# Patient Record
Sex: MALE | Race: BLACK OR AFRICAN AMERICAN | Employment: OTHER | URBAN - METROPOLITAN AREA
[De-identification: names, ages, dates, MRNs, and addresses within clinical notes are randomized per-mention and may not be internally consistent; named-entity substitution may affect disease eponyms.]

---

## 2019-06-25 ENCOUNTER — OFFICE VISIT (OUTPATIENT)
Dept: SURGERY | Age: 69
End: 2019-06-25

## 2019-06-25 VITALS
DIASTOLIC BLOOD PRESSURE: 66 MMHG | OXYGEN SATURATION: 96 % | RESPIRATION RATE: 20 BRPM | HEART RATE: 58 BPM | TEMPERATURE: 98.1 F | SYSTOLIC BLOOD PRESSURE: 118 MMHG | BODY MASS INDEX: 38.37 KG/M2 | WEIGHT: 299 LBS | HEIGHT: 74 IN

## 2019-06-25 DIAGNOSIS — K43.2 RECURRENT VENTRAL INCISIONAL HERNIA: Primary | ICD-10-CM

## 2019-06-25 RX ORDER — FENOFIBRATE 160 MG/1
160 TABLET ORAL DAILY
COMMUNITY
Start: 2019-04-29 | End: 2020-06-16

## 2019-06-25 RX ORDER — AMLODIPINE BESYLATE 10 MG/1
TABLET ORAL
Refills: 2 | Status: ON HOLD | COMMUNITY
Start: 2019-05-21 | End: 2021-06-02 | Stop reason: CLARIF

## 2019-06-25 NOTE — PROGRESS NOTES
HISTORY OF PRESENT ILLNESS  Temo Verma is a 77 y.o. male who comes in for consultation for a recurrent hernia  HPI    He underwent a left nephrectomy for renal cell ca in 2010. He developed a hernia at the hand port in the LLQ.   7/18/2016 Kumar Powell and August Pisano did a left sided component separation and incisional hernia repair with mesh. The next week he developed worsening pain and SOB and was worked up and found to have a recurrence of the hernia. Prior to surgery he reports intermittent pain and nausea/vomiting. He now has continuous pain which waxes and wanes. He reports heartburn but no N/V at this time. He denies fever, chills, or sweats. He is frustrated and has some SOB but CT of the chest was negative for PE by report. Dr Radha Powell is now recommending weight loss surgery. Past Medical History:   Diagnosis Date    CAD (coronary artery disease)     Hypercholesterolemia     Hypertension      Past Surgical History:   Procedure Laterality Date    CARDIAC SURG PROCEDURE UNLIST      3 bypas card    HX HERNIA REPAIR      HX UROLOGICAL      1 kidney removed. Family History   Problem Relation Age of Onset    Cancer Mother         Stomach Cancer    Heart Disease Father     Stroke Father     No Known Problems Sister     No Known Problems Brother     No Known Problems Brother     No Known Problems Brother     No Known Problems Sister     Cancer Sister     Cancer Brother     No Known Problems Brother      Social History     Tobacco Use    Smoking status: Current Every Day Smoker     Packs/day: 0.25     Years: 30.00     Pack years: 7.50    Smokeless tobacco: Never Used    Tobacco comment: 7-8 cigarettes a day. Substance Use Topics    Alcohol use: No    Drug use: Not Currently     Current Outpatient Medications   Medication Sig    fenofibrate (LOFIBRA) 160 mg tablet Take 160 mg by mouth daily.     amLODIPine (NORVASC) 10 mg tablet TAKE 1 TABLET BY MOUTH EVERY DAY    losartan (COZAAR) 100 mg tablet Take 100 mg by mouth daily.  hydrochlorothiazide (HYDRODIURIL) 25 mg tablet Take 25 mg by mouth daily.  CLOPIDOGREL BISULFATE (CLOPIDOGREL PO) Take  by mouth.  spironolactone (ALDACTONE) 25 mg tablet Take  by mouth daily. No current facility-administered medications for this visit. Allergies   Allergen Reactions    Percocet [Oxycodone-Acetaminophen] Other (comments)       Review of Systems   Constitutional: Negative for chills, diaphoresis, fever, malaise/fatigue and weight loss. HENT: Negative for congestion, ear pain and sore throat. Eyes: Negative for blurred vision and pain. Respiratory: Positive for shortness of breath. Negative for cough, hemoptysis, sputum production, wheezing and stridor. Cardiovascular: Positive for chest pain. Negative for palpitations, orthopnea, claudication, leg swelling and PND. Gastrointestinal: Positive for abdominal pain, constipation, diarrhea and heartburn. Negative for blood in stool, melena, nausea and vomiting. Genitourinary: Negative for dysuria, flank pain, frequency, hematuria and urgency. Musculoskeletal: Positive for back pain. Negative for joint pain, myalgias and neck pain. Skin: Negative for itching and rash. Neurological: Negative for dizziness, tremors, focal weakness, seizures, weakness and headaches. Endo/Heme/Allergies: Negative for polydipsia. Psychiatric/Behavioral: Negative for depression and memory loss. The patient is not nervous/anxious. Visit Vitals  /66 (BP 1 Location: Right arm, BP Patient Position: Sitting)   Pulse (!) 58   Temp 98.1 °F (36.7 °C) (Oral)   Resp 20   Ht 6' 2\" (1.88 m)   Wt 135.6 kg (299 lb)   SpO2 96%   BMI 38.39 kg/m²       Physical Exam   Constitutional: He is oriented to person, place, and time. He appears well-developed and well-nourished. No distress. HENT:   Head: Normocephalic and atraumatic.    Mouth/Throat: Oropharynx is clear and moist. No oropharyngeal exudate. Eyes: Pupils are equal, round, and reactive to light. Conjunctivae and EOM are normal. No scleral icterus. Neck: Normal range of motion. Neck supple. No tracheal deviation present. No thyromegaly present. Cardiovascular: Normal rate, regular rhythm and normal heart sounds. Exam reveals no gallop and no friction rub. No murmur heard. Pulmonary/Chest: Effort normal and breath sounds normal. No stridor. No respiratory distress. He has no wheezes. He has no rales. Abdominal: Soft. Normal appearance and bowel sounds are normal. He exhibits no distension, no pulsatile liver and no mass. There is no hepatosplenomegaly. There is no tenderness. There is no rebound, no guarding and no CVA tenderness. A hernia is present. Hernia confirmed positive in the ventral area (large reducible LLQ hernia). Hernia confirmed negative in the right inguinal area and confirmed negative in the left inguinal area. Genitourinary: Testes normal. Cremasteric reflex is present. Circumcised. Musculoskeletal: Normal range of motion. He exhibits no edema or tenderness. Lymphadenopathy:     He has no cervical adenopathy. Neurological: He is alert and oriented to person, place, and time. No cranial nerve deficit. Coordination normal.   Skin: Skin is warm and dry. No rash noted. He is not diaphoretic. No erythema. Psychiatric: He has a normal mood and affect. His behavior is normal. Judgment and thought content normal.       ASSESSMENT and PLAN  1.  Large LLQ recurrent incisional hernia. I cannot really even feel the fascia edges and the hernia goes from costal edge to pelvis. I explained about the anatomy and pathophysiology of hernias and the risk of incarceration and strangulation of the bowel. I explained about hernia repairs (open with and without mesh, and laparoscopic with mesh).   I explained the risks and benefits of repair including bleeding, infection, chronic pain, , bowel or bladder injury, hernia recurrence, mesh infection requiring removal.  I explained it would be a six to eight week recuperation with no driving for 5 - 7 days, no lifting for six weeks. He would have loss of domain if repaired and has extremely high risk of early and late recurrence. Recommend abdominal binder  2. Morbid obesity. Body mass index is 38.39 kg/m². This is likely the contributor to the early hernia repair failure. He has lost only 13 pounds in the last 3 years. I feel he should lose another  pounds prior to repair or he will undoubtedly fail again  3. CAD. On statin and plavix  4. Essential hypertension on rx  5.   Spinal stenosis with back pain    He will try to lose weight and consider weight loss surgery or diet regimen  Offered dietician consult  Wear abdominal binder   Offered CT to better assess size of the defect  I also suggested consideration going to [facility], Dr Angel De Jesus for another opinion    RTC after weight loss

## 2019-06-25 NOTE — PROGRESS NOTES
Chief Complaint   Patient presents with    Hernia (Non Specific)     Pt to discuss hernia. 1. Have you been to the ER, urgent care clinic since your last visit? Hospitalized since your last visit? No    2. Have you seen or consulted any other health care providers outside of the [address] [place] [address] since your last visit? Include any pap smears or colon screening.  No

## 2020-06-16 RX ORDER — FENOFIBRATE 54 MG/1
108 TABLET ORAL DAILY
Status: ON HOLD | COMMUNITY
End: 2021-06-02 | Stop reason: CLARIF

## 2020-06-19 ENCOUNTER — OFFICE VISIT (OUTPATIENT)
Dept: URGENT CARE | Age: 70
End: 2020-06-19

## 2020-06-19 VITALS — OXYGEN SATURATION: 95 % | RESPIRATION RATE: 20 BRPM | TEMPERATURE: 98.6 F | HEART RATE: 51 BPM

## 2020-06-19 DIAGNOSIS — Z20.822 ENCOUNTER FOR LABORATORY TESTING FOR COVID-19 VIRUS: Primary | ICD-10-CM

## 2020-06-23 ENCOUNTER — HOSPITAL ENCOUNTER (OUTPATIENT)
Age: 70
Setting detail: OUTPATIENT SURGERY
Discharge: HOME | End: 2020-06-23
Attending: SPECIALIST | Admitting: SPECIALIST
Payer: MEDICARE

## 2020-06-23 ENCOUNTER — ANESTHESIA EVENT (OUTPATIENT)
Dept: ENDOSCOPY | Age: 70
End: 2020-06-23
Payer: MEDICARE

## 2020-06-23 ENCOUNTER — ANESTHESIA (OUTPATIENT)
Dept: ENDOSCOPY | Age: 70
End: 2020-06-23
Payer: MEDICARE

## 2020-06-23 VITALS
BODY MASS INDEX: 39.42 KG/M2 | RESPIRATION RATE: 20 BRPM | SYSTOLIC BLOOD PRESSURE: 160 MMHG | TEMPERATURE: 98.7 F | DIASTOLIC BLOOD PRESSURE: 79 MMHG | WEIGHT: 307 LBS | HEART RATE: 53 BPM | OXYGEN SATURATION: 99 %

## 2020-06-23 PROCEDURE — 88305 TISSUE EXAM BY PATHOLOGIST: CPT

## 2020-06-23 PROCEDURE — 74011250636 HC RX REV CODE- 250/636: Performed by: NURSE ANESTHETIST, CERTIFIED REGISTERED

## 2020-06-23 PROCEDURE — 74011000250 HC RX REV CODE- 250: Performed by: NURSE ANESTHETIST, CERTIFIED REGISTERED

## 2020-06-23 PROCEDURE — 76040000007: Performed by: SPECIALIST

## 2020-06-23 PROCEDURE — 77030021593 HC FCPS BIOP ENDOSC BSC -A: Performed by: SPECIALIST

## 2020-06-23 PROCEDURE — 77030013992 HC SNR POLYP ENDOSC BSC -B: Performed by: SPECIALIST

## 2020-06-23 PROCEDURE — 76060000032 HC ANESTHESIA 0.5 TO 1 HR: Performed by: SPECIALIST

## 2020-06-23 RX ORDER — SODIUM CHLORIDE 0.9 % (FLUSH) 0.9 %
5-40 SYRINGE (ML) INJECTION EVERY 8 HOURS
Status: DISCONTINUED | OUTPATIENT
Start: 2020-06-23 | End: 2020-06-23 | Stop reason: HOSPADM

## 2020-06-23 RX ORDER — ATROPINE SULFATE 0.1 MG/ML
0.5 INJECTION INTRAVENOUS
Status: DISCONTINUED | OUTPATIENT
Start: 2020-06-23 | End: 2020-06-23 | Stop reason: HOSPADM

## 2020-06-23 RX ORDER — SODIUM CHLORIDE 9 MG/ML
50 INJECTION, SOLUTION INTRAVENOUS CONTINUOUS
Status: DISCONTINUED | OUTPATIENT
Start: 2020-06-23 | End: 2020-06-23 | Stop reason: HOSPADM

## 2020-06-23 RX ORDER — EPINEPHRINE 0.1 MG/ML
1 INJECTION INTRACARDIAC; INTRAVENOUS
Status: DISCONTINUED | OUTPATIENT
Start: 2020-06-23 | End: 2020-06-23 | Stop reason: HOSPADM

## 2020-06-23 RX ORDER — LIDOCAINE HYDROCHLORIDE 20 MG/ML
INJECTION, SOLUTION EPIDURAL; INFILTRATION; INTRACAUDAL; PERINEURAL AS NEEDED
Status: DISCONTINUED | OUTPATIENT
Start: 2020-06-23 | End: 2020-06-23 | Stop reason: HOSPADM

## 2020-06-23 RX ORDER — DEXTROMETHORPHAN/PSEUDOEPHED 2.5-7.5/.8
1.2 DROPS ORAL
Status: DISCONTINUED | OUTPATIENT
Start: 2020-06-23 | End: 2020-06-23 | Stop reason: HOSPADM

## 2020-06-23 RX ORDER — SODIUM CHLORIDE 0.9 % (FLUSH) 0.9 %
5-40 SYRINGE (ML) INJECTION AS NEEDED
Status: DISCONTINUED | OUTPATIENT
Start: 2020-06-23 | End: 2020-06-23 | Stop reason: HOSPADM

## 2020-06-23 RX ORDER — PROPOFOL 10 MG/ML
INJECTION, EMULSION INTRAVENOUS AS NEEDED
Status: DISCONTINUED | OUTPATIENT
Start: 2020-06-23 | End: 2020-06-23 | Stop reason: HOSPADM

## 2020-06-23 RX ORDER — FLUMAZENIL 0.1 MG/ML
0.2 INJECTION INTRAVENOUS
Status: DISCONTINUED | OUTPATIENT
Start: 2020-06-23 | End: 2020-06-23 | Stop reason: HOSPADM

## 2020-06-23 RX ORDER — SODIUM CHLORIDE 9 MG/ML
INJECTION, SOLUTION INTRAVENOUS
Status: DISCONTINUED | OUTPATIENT
Start: 2020-06-23 | End: 2020-06-23 | Stop reason: HOSPADM

## 2020-06-23 RX ORDER — DOXAZOSIN 4 MG/1
4 TABLET ORAL DAILY
COMMUNITY

## 2020-06-23 RX ORDER — NALOXONE HYDROCHLORIDE 0.4 MG/ML
0.4 INJECTION, SOLUTION INTRAMUSCULAR; INTRAVENOUS; SUBCUTANEOUS
Status: DISCONTINUED | OUTPATIENT
Start: 2020-06-23 | End: 2020-06-23 | Stop reason: HOSPADM

## 2020-06-23 RX ADMIN — PROPOFOL 50 MG: 10 INJECTION, EMULSION INTRAVENOUS at 11:07

## 2020-06-23 RX ADMIN — PROPOFOL 100 MG: 10 INJECTION, EMULSION INTRAVENOUS at 10:44

## 2020-06-23 RX ADMIN — PROPOFOL 50 MG: 10 INJECTION, EMULSION INTRAVENOUS at 11:18

## 2020-06-23 RX ADMIN — PROPOFOL 50 MG: 10 INJECTION, EMULSION INTRAVENOUS at 10:46

## 2020-06-23 RX ADMIN — PROPOFOL 50 MG: 10 INJECTION, EMULSION INTRAVENOUS at 11:10

## 2020-06-23 RX ADMIN — LIDOCAINE HYDROCHLORIDE 40 MG: 20 INJECTION, SOLUTION EPIDURAL; INFILTRATION; INTRACAUDAL; PERINEURAL at 10:44

## 2020-06-23 RX ADMIN — PROPOFOL 50 MG: 10 INJECTION, EMULSION INTRAVENOUS at 11:01

## 2020-06-23 RX ADMIN — PROPOFOL 50 MG: 10 INJECTION, EMULSION INTRAVENOUS at 10:57

## 2020-06-23 RX ADMIN — PROPOFOL 50 MG: 10 INJECTION, EMULSION INTRAVENOUS at 10:48

## 2020-06-23 RX ADMIN — PROPOFOL 50 MG: 10 INJECTION, EMULSION INTRAVENOUS at 11:14

## 2020-06-23 RX ADMIN — SODIUM CHLORIDE: 900 INJECTION, SOLUTION INTRAVENOUS at 10:44

## 2020-06-23 NOTE — PROCEDURES
[address]  [address]                 Colonoscopy Procedure Note    Indications:   See Preoperative Diagnosis above  Referring Physician: Kena Hughes MD  Anesthesia/Sedation: MAC anesthesia Propofol  Endoscopist:  Dr. Reggie Woodward  Assistant:  Endoscopy Technician-1: Kristal Alicia  Endoscopy RN-1: Shanae Coleman RN  Preoperative diagnosis: STOOL DNA-BASED COLORECTAL CANCER SCREENING POSITIVE,  Postoperative diagnosis: Colon polyps, diverticulosis    Procedure in Detail:  Informed consent was obtained for the procedure, including sedation. Risks of perforation, hemorrhage, adverse drug reaction, and aspiration were discussed. The patient was placed in the left lateral decubitus position. Based on the pre-procedure assessment, including review of the patient's medical history, medications, allergies, and review of systems, he had been deemed to be an appropriate candidate for moderate sedation; he was therefore sedated with the medications listed above. The patient was monitored continuously with ECG tracing, pulse oximetry, blood pressure monitoring, and direct observations. A rectal examination was performed. The [id number] was inserted into the rectum and advanced under direct vision to the cecum, which was identified by the ileocecal valve and appendiceal orifice. The quality of the colonic preparation was adequate. A careful inspection was made as the colonoscope was withdrawn, including a retroflexed view of the rectum; findings and interventions are described below. Appropriate photodocumentation was obtained.     Findings:  Rectum: normal  Sigmoid: mild diverticulosis; 4 mm polyp removed with cold biopsy  Descending Colon: mild diverticulosis; 12 mm sessile polyp removed with hot snare  Transverse Colon: 5 Polyps measuring 4,5,6, 8,12 mm were removed with hot snare  Ascending Colon: 4 mm sessile polyp removed with cold biopsy  Cecum: 4 mm sessile polyp removed with cold biopsy    Specimens:    colon polyps    EBL: None    Complications: None; patient tolerated the procedure well. Recommendations:     - Await pathology. - Repeat colon in 1 yr with 2 day prep.  HIgh fiber diet      Signed By: Yonathan Marrero MD                        June 23, 2020

## 2020-06-23 NOTE — PERIOP NOTES
1035: .Patient has been evaluated by anesthesia pre-procedure. Patient alert and oriented. Vital signs will not be charted by the Endoscopy nurse. All vitals, airway, and loc are monitored by anesthesia staff throughout procedure.        .Endoscope will be pre-cleaned at bedside immediately following procedure by LATOYA

## 2020-06-23 NOTE — ANESTHESIA POSTPROCEDURE EVALUATION
Post-Anesthesia Evaluation and Assessment    Patient: Tracie Wells MRN: [id number]  SSN: [id number]    YOB: 1950  Age: 72 y.o. Sex: male      I have evaluated the patient and they are stable and ready for discharge from the PACU. Cardiovascular Function/Vital Signs  Visit Vitals  /79   Pulse (!) 53   Temp 37.1 °C (98.7 °F)   Resp 20   Wt 139.3 kg (307 lb)   SpO2 99%   BMI 39.42 kg/m²       Patient is status post MAC anesthesia for Procedure(s):  COLONOSCOPY awaiting  ENDOSCOPIC POLYPECTOMY. Nausea/Vomiting: None    Postoperative hydration reviewed and adequate. Pain:  Pain Scale 1: Numeric (0 - 10) (06/23/20 1154)  Pain Intensity 1: 0 (06/23/20 1154)   Managed    Neurological Status: At baseline    Mental Status, Level of Consciousness: Alert and  oriented to person, place, and time    Pulmonary Status:   O2 Device: Room air (06/23/20 1154)   Adequate oxygenation and airway patent    Complications related to anesthesia: None    Post-anesthesia assessment completed. No concerns    Signed By: Drew Danielson MD     June 23, 2020              Procedure(s):  COLONOSCOPY awaiting  ENDOSCOPIC POLYPECTOMY. MAC    <BSHSIANPOST>    INITIAL Post-op Vital signs:   Vitals Value Taken Time   /79 6/23/2020 11:54 AM   Temp     Pulse 0 6/23/2020 11:57 AM   Resp 0 6/23/2020 11:57 AM   SpO2 0 % 6/23/2020 11:57 AM   Vitals shown include unvalidated device data.

## 2020-06-23 NOTE — H&P
Colonoscopy History and Physical      The patient was seen and examined. Date of last colonoscopy: none, Polyps  No      The airway was assessed and documented. The problem list, past medical history, and medications were reviewed.      Patient Active Problem List   Diagnosis Code    Recurrent ventral incisional hernia K43.2    Morbid obesity with BMI of 40.0-44.9, adult ([facility]) E66.01, Z68.41     Social History     Socioeconomic History    Marital status:      Spouse name: Not on file    Number of children: Not on file    Years of education: Not on file    Highest education level: Not on file   Occupational History    Not on file   Social Needs    Financial resource strain: Not on file    Food insecurity     Worry: Not on file     Inability: Not on file    Transportation needs     Medical: Not on file     Non-medical: Not on file   Tobacco Use    Smoking status: Former Smoker     Packs/day: 0.25     Years: 30.00     Pack years: 7.50    Smokeless tobacco: Never Used    Tobacco comment: 7-8 cigarettes a day./quit a couple of weeks ago per pt on 6/16/2020   Substance and Sexual Activity    Alcohol use: No    Drug use: Not Currently    Sexual activity: Yes     Partners: Female   Lifestyle    Physical activity     Days per week: Not on file     Minutes per session: Not on file    Stress: Not on file   Relationships    Social connections     Talks on phone: Not on file     Gets together: Not on file     Attends [employer]ism service: Not on file     Active member of club or organization: Not on file     Attends meetings of clubs or organizations: Not on file     Relationship status: Not on file    Intimate partner violence     Fear of current or ex partner: Not on file     Emotionally abused: Not on file     Physically abused: Not on file     Forced sexual activity: Not on file   Other Topics Concern    Not on file   Social History Narrative    Not on file     Past Medical History:   Diagnosis Date  Arthritis     CAD (coronary artery disease)     GERD (gastroesophageal reflux disease)     resolved after wt loss    Hypercholesterolemia     Hypertension     Ill-defined condition     intentional wt loss - 125 pounds    Ill-defined condition     spinal stenosis - pain is better since wt loss    Morbid obesity (HCC)          Prior to Admission Medications   Prescriptions Last Dose Informant Patient Reported? Taking? ASPIRIN PO 6/16/2020  Yes Yes   Sig: Take 81 mg by mouth daily. CLOPIDOGREL BISULFATE (CLOPIDOGREL PO) 6/16/2020  Yes No   Sig: Take  by mouth. amLODIPine (NORVASC) 10 mg tablet 6/22/2020 at Unknown time  Yes Yes   Sig: TAKE 1 TABLET BY MOUTH EVERY DAY   doxazosin (CARDURA) 4 mg tablet 6/22/2020 at Unknown time  Yes Yes   Sig: Take 4 mg by mouth daily. fenofibrate (LOFIBRA) 54 mg tablet 6/22/2020 at Unknown time  Yes Yes   Sig: Take 108 mg by mouth daily. Facility-Administered Medications: None       The patient was seen and examined in the endoscopy suite. The airway was assessed and documented. The problem list and medications were reviewed. Chief complaint, history of present illness, and review of systems and Past medical History are positive for: positive cologuard and CAD    The heart, lungs and mental status were satisfactory for the administration of sedation and for the procedure. I discussed with the patient the objectives, risks, consequences and alternatives to the procedure.          Plan: Endoscopic procedure with sedation     Verito Figueroa MD   6/23/2020  10:39 AM

## 2020-06-23 NOTE — DISCHARGE INSTRUCTIONS
Mack Redd  [id number]  1950    COLON DISCHARGE INSTRUCTIONS  Discomfort:  Redness at IV site- apply warm compress to area; if redness or soreness persist- contact your physician  There may be a slight amount of blood passed from the rectum  Gaseous discomfort- walking, belching will help relieve any discomfort    DIET:   High fiber diet. - however -  remember your colon is empty and a heavy meal will produce gas. Avoid these foods:  vegetables, fried / greasy foods, carbonated drinks for today. You may not drink alcoholic beverages for at least 12 hours    MEDICATIONS:   Regarding Aspirin or Nonsteroidal medications, please see below. ACTIVITY:  You may resume your normal daily activities it is recommended that you spend the remainder of the day resting -  avoid any strenuous activity. You may not operate a vehicle for 12 hours  You may not engage in an occupation involving machinery or appliances for rest of today  Avoid making any critical decisions for at least 24 hour    CALL M.D. ANY SIGN OF:  Increasing pain, nausea, vomiting  Abdominal distension (swelling)  New increased bleeding (oral or rectal)  Fever (chills)  Pain in chest area  Bloody discharge from nose or mouth  Shortness of breath    You may not  take any Advil, Aspirin, Ibuprofen, Motrin, Aleve, or Goodys for 10 days, ONLY  Tylenol as needed for pain. Post procedure diagnosis: Colon polyps, diverticulosis      Follow-up Instructions:   Call Dr. Mirtha Jimenez  Results of procedure / biopsy in 10 days  Telephone #  [phone]      DISCHARGE SUMMARY from Nurse    The following personal items collected during your admission are returned to you:   Dental Appliance: Dental Appliances: Uppers  Vision: Visual Aid: Glasses  Hearing Aid:    Jewelry:    Clothing:    Other Valuables:    Valuables sent to safe:      Learning About Coronavirus (COVID-19)  Coronavirus (COVID-19): Overview  What is coronavirus ([id number])?   The coronavirus disease (COVID-19) is caused by a virus. It is an illness that was first found in [place], [place], in December 2019. It has since spread worldwide. The virus can cause fever, cough, and trouble breathing. In severe cases, it can cause pneumonia and make it hard to breathe without help. It can cause death. Coronaviruses are a large group of viruses. They cause the common cold. They also cause more serious illnesses like Middle East respiratory syndrome (MERS) and severe acute respiratory syndrome (SARS). COVID-19 is caused by a novel coronavirus. That means it's a new type that has not been seen in people before. This virus spreads person-to-person through droplets from coughing and sneezing. It can also spread when you are close to someone who is infected. And it can spread when you touch something that has the virus on it, such as a doorknob or a tabletop. What can you do to protect yourself from coronavirus (COVID-19)? The best way to protect yourself from getting sick is to:  · Avoid areas where there is an outbreak. · Avoid contact with people who may be infected. · Wash your hands often with soap or alcohol-based hand sanitizers. · Avoid crowds and try to stay at least 6 feet away from other people. · Wash your hands often, especially after you cough or sneeze. Use soap and water, and scrub for at least 20 seconds. If soap and water aren't available, use an alcohol-based hand . · Avoid touching your mouth, nose, and eyes. What can you do to avoid spreading the virus to others? To help avoid spreading the virus to others:  · Cover your mouth with a tissue when you cough or sneeze. Then throw the tissue in the trash. · Use a disinfectant to clean things that you touch often. · Stay home if you are sick or have been exposed to the virus. Don't go to school, work, or public areas. And don't use public transportation. · If you are sick:  ? Leave your home only if you need to get medical care.  But call the doctor's office first so they know you're coming. And wear a face mask, if you have one.  ? If you have a face mask, wear it whenever you're around other people. It can help stop the spread of the virus when you cough or sneeze. ? Clean and disinfect your home every day. Use household  and disinfectant wipes or sprays. Take special care to clean things that you grab with your hands. These include doorknobs, remote controls, phones, and handles on your refrigerator and microwave. And don't forget countertops, tabletops, bathrooms, and computer keyboards. When to call for help  Call 911 anytime you think you may need emergency care. For example, call if:  · You have severe trouble breathing. (You can't talk at all.)  · You have constant chest pain or pressure. · You are severely dizzy or lightheaded. · You are confused or can't think clearly. · Your face and lips have a blue color. · You pass out (lose consciousness) or are very hard to wake up. Call your doctor now if you develop symptoms such as:  · Shortness of breath. · Fever. · Cough. If you need to get care, call ahead to the doctor's office for instructions before you go. Make sure you wear a face mask, if you have one, to prevent exposing other people to the virus. Where can you get the latest information? The following health organizations are tracking and studying this virus. Their websites contain the most up-to-date information. Josh Lamb also learn what to do if you think you may have been exposed to the virus. · U.S. Centers for Disease Control and Prevention (CDC): The CDC provides updated news about the disease and travel advice. The website also tells you how to prevent the spread of infection. www.cdc.gov  · World Health Organization [facility]): WHO offers information about the virus outbreaks.  WHO also has travel advice. www.who.int  Current as of: April 1, 2020               Content Version: 12.4  © 4858-9685 [employer]. Care instructions adapted under license by your healthcare professional. If you have questions about a medical condition or this instruction, always ask your healthcare professional. [place] [address] any warranty or liability for your use of this information. DISCHARGE SUMMARY from Nurse    PATIENT INSTRUCTIONS:    After general anesthesia or intravenous sedation, for 24 hours or while taking prescription Narcotics:  · Limit your activities  · Do not drive and operate hazardous machinery  · Do not make important personal or business decisions  · Do  not drink alcoholic beverages  · If you have not urinated within 8 hours after discharge, please contact your surgeon on call. Report the following to your surgeon:  · Excessive pain, swelling, redness or odor of or around the surgical area  · Temperature over 100.5  · Nausea and vomiting lasting longer than 4 hours or if unable to take medications  · Any signs of decreased circulation or nerve impairment to extremity: change in color, persistent  numbness, tingling, coldness or increase pain  · Any questions    What to do at Home:      *  Please give a list of your current medications to your Primary Care Provider. *  Please update this list whenever your medications are discontinued, doses are      changed, or new medications (including over-the-counter products) are added. *  Please carry medication information at all times in case of emergency situations. These are general instructions for a healthy lifestyle:    No smoking/ No tobacco products/ Avoid exposure to second hand smoke  Surgeon General's Warning:  Quitting smoking now greatly reduces serious risk to your health.     Obesity, smoking, and sedentary lifestyle greatly increases your risk for illness    A healthy diet, regular physical exercise & weight monitoring are important for maintaining a healthy lifestyle    You may be retaining fluid if you have a history of heart failure or if you experience any of the following symptoms:  Weight gain of 3 pounds or more overnight or 5 pounds in a week, increased swelling in our hands or feet or shortness of breath while lying flat in bed. Please call your doctor as soon as you notice any of these symptoms; do not wait until your next office visit. The discharge information has been reviewed with the patient. The patient verbalized understanding. Discharge medications reviewed with the patient and appropriate educational materials and side effects teaching were provided.   ___________________________________________________________________________________________________________________________________

## 2020-06-24 LAB — SARS-COV-2, NAA: NOT DETECTED

## 2021-05-21 NOTE — PERIOP NOTES
Patient denied any COVID-19 symptoms or possible exposure that would require a pre-procedural COVID-19 test for the Cath Lab procedure scheduled on 6/1.

## 2021-06-01 ENCOUNTER — HOSPITAL ENCOUNTER (OUTPATIENT)
Age: 71
Discharge: HOME | End: 2021-06-02
Attending: INTERNAL MEDICINE | Admitting: INTERNAL MEDICINE
Payer: MEDICARE

## 2021-06-01 ENCOUNTER — APPOINTMENT (OUTPATIENT)
Dept: GENERAL RADIOLOGY | Age: 71
End: 2021-06-01
Attending: INTERNAL MEDICINE
Payer: MEDICARE

## 2021-06-01 DIAGNOSIS — R07.9 CHEST PAIN, UNSPECIFIED TYPE: ICD-10-CM

## 2021-06-01 LAB
ACT BLD: 263 SECS (ref 79–138)
ACT BLD: 268 SECS (ref 79–138)

## 2021-06-01 PROCEDURE — 74011250636 HC RX REV CODE- 250/636: Performed by: INTERNAL MEDICINE

## 2021-06-01 PROCEDURE — C1769 GUIDE WIRE: HCPCS | Performed by: INTERNAL MEDICINE

## 2021-06-01 PROCEDURE — 74011250637 HC RX REV CODE- 250/637: Performed by: STUDENT IN AN ORGANIZED HEALTH CARE EDUCATION/TRAINING PROGRAM

## 2021-06-01 PROCEDURE — C1876 STENT, NON-COA/NON-COV W/DEL: HCPCS | Performed by: INTERNAL MEDICINE

## 2021-06-01 PROCEDURE — 74011250636 HC RX REV CODE- 250/636: Performed by: STUDENT IN AN ORGANIZED HEALTH CARE EDUCATION/TRAINING PROGRAM

## 2021-06-01 PROCEDURE — C1725 CATH, TRANSLUMIN NON-LASER: HCPCS | Performed by: INTERNAL MEDICINE

## 2021-06-01 PROCEDURE — C1894 INTRO/SHEATH, NON-LASER: HCPCS | Performed by: INTERNAL MEDICINE

## 2021-06-01 PROCEDURE — 77030016704 HC CATH ANGI DX PRF1 MRTM -B: Performed by: INTERNAL MEDICINE

## 2021-06-01 PROCEDURE — 93459 L HRT ART/GRFT ANGIO: CPT | Performed by: INTERNAL MEDICINE

## 2021-06-01 PROCEDURE — 77030019697 HC SYR ANGI INFL MRTM -B: Performed by: INTERNAL MEDICINE

## 2021-06-01 PROCEDURE — 99153 MOD SED SAME PHYS/QHP EA: CPT | Performed by: INTERNAL MEDICINE

## 2021-06-01 PROCEDURE — 92978 ENDOLUMINL IVUS OCT C 1ST: CPT | Performed by: INTERNAL MEDICINE

## 2021-06-01 PROCEDURE — 99152 MOD SED SAME PHYS/QHP 5/>YRS: CPT | Performed by: INTERNAL MEDICINE

## 2021-06-01 PROCEDURE — 74011000250 HC RX REV CODE- 250: Performed by: INTERNAL MEDICINE

## 2021-06-01 PROCEDURE — C1874 STENT, COATED/COV W/DEL SYS: HCPCS | Performed by: INTERNAL MEDICINE

## 2021-06-01 PROCEDURE — 85347 COAGULATION TIME ACTIVATED: CPT

## 2021-06-01 PROCEDURE — 77030004549 HC CATH ANGI DX PRF MRTM -A: Performed by: INTERNAL MEDICINE

## 2021-06-01 PROCEDURE — 92928 PRQ TCAT PLMT NTRAC ST 1 LES: CPT | Performed by: INTERNAL MEDICINE

## 2021-06-01 PROCEDURE — C1760 CLOSURE DEV, VASC: HCPCS | Performed by: INTERNAL MEDICINE

## 2021-06-01 PROCEDURE — 74011250637 HC RX REV CODE- 250/637: Performed by: INTERNAL MEDICINE

## 2021-06-01 PROCEDURE — 77030018729 HC ELECTRD DEFIB PAD CARD -B: Performed by: INTERNAL MEDICINE

## 2021-06-01 PROCEDURE — 71045 X-RAY EXAM CHEST 1 VIEW: CPT

## 2021-06-01 PROCEDURE — 74011000636 HC RX REV CODE- 636: Performed by: INTERNAL MEDICINE

## 2021-06-01 PROCEDURE — C1753 CATH, INTRAVAS ULTRASOUND: HCPCS | Performed by: INTERNAL MEDICINE

## 2021-06-01 PROCEDURE — 77030028837 HC SYR ANGI PWR INJ COEU -A: Performed by: INTERNAL MEDICINE

## 2021-06-01 PROCEDURE — C1887 CATHETER, GUIDING: HCPCS | Performed by: INTERNAL MEDICINE

## 2021-06-01 DEVICE — STENT RONYX35022UX RESOLUTE ONYX 3.50X22
Type: IMPLANTABLE DEVICE | Status: FUNCTIONAL
Brand: RESOLUTE ONYX™

## 2021-06-01 DEVICE — STENT RONYX30026UX RESOLUTE ONYX 3.00X26
Type: IMPLANTABLE DEVICE | Status: FUNCTIONAL
Brand: RESOLUTE ONYX™

## 2021-06-01 RX ORDER — ATORVASTATIN CALCIUM 10 MG/1
10 TABLET, FILM COATED ORAL DAILY
Status: ON HOLD | COMMUNITY
End: 2021-06-02 | Stop reason: SDUPTHER

## 2021-06-01 RX ORDER — MORPHINE SULFATE 2 MG/ML
INJECTION, SOLUTION INTRAMUSCULAR; INTRAVENOUS AS NEEDED
Status: DISCONTINUED | OUTPATIENT
Start: 2021-06-01 | End: 2021-06-01 | Stop reason: HOSPADM

## 2021-06-01 RX ORDER — MIDAZOLAM HYDROCHLORIDE 1 MG/ML
INJECTION, SOLUTION INTRAMUSCULAR; INTRAVENOUS AS NEEDED
Status: DISCONTINUED | OUTPATIENT
Start: 2021-06-01 | End: 2021-06-01 | Stop reason: HOSPADM

## 2021-06-01 RX ORDER — SODIUM CHLORIDE 0.9 % (FLUSH) 0.9 %
5-40 SYRINGE (ML) INJECTION AS NEEDED
Status: DISCONTINUED | OUTPATIENT
Start: 2021-06-01 | End: 2021-06-02 | Stop reason: HOSPADM

## 2021-06-01 RX ORDER — CLOPIDOGREL BISULFATE 75 MG/1
75 TABLET ORAL DAILY
Status: DISCONTINUED | OUTPATIENT
Start: 2021-06-02 | End: 2021-06-02 | Stop reason: HOSPADM

## 2021-06-01 RX ORDER — GUAIFENESIN 100 MG/5ML
LIQUID (ML) ORAL AS NEEDED
Status: DISCONTINUED | OUTPATIENT
Start: 2021-06-01 | End: 2021-06-01 | Stop reason: HOSPADM

## 2021-06-01 RX ORDER — HEPARIN SODIUM 200 [USP'U]/100ML
INJECTION, SOLUTION INTRAVENOUS
Status: COMPLETED | OUTPATIENT
Start: 2021-06-01 | End: 2021-06-01

## 2021-06-01 RX ORDER — FENTANYL CITRATE 50 UG/ML
INJECTION, SOLUTION INTRAMUSCULAR; INTRAVENOUS AS NEEDED
Status: DISCONTINUED | OUTPATIENT
Start: 2021-06-01 | End: 2021-06-01 | Stop reason: HOSPADM

## 2021-06-01 RX ORDER — BACLOFEN 10 MG/1
TABLET ORAL 3 TIMES DAILY
COMMUNITY

## 2021-06-01 RX ORDER — ATORVASTATIN CALCIUM 40 MG/1
40 TABLET, FILM COATED ORAL
Status: DISCONTINUED | OUTPATIENT
Start: 2021-06-01 | End: 2021-06-02

## 2021-06-01 RX ORDER — ACETAMINOPHEN 500 MG
1000 TABLET ORAL
Status: DISCONTINUED | OUTPATIENT
Start: 2021-06-01 | End: 2021-06-02 | Stop reason: HOSPADM

## 2021-06-01 RX ORDER — SODIUM CHLORIDE 0.9 % (FLUSH) 0.9 %
5-40 SYRINGE (ML) INJECTION EVERY 8 HOURS
Status: DISCONTINUED | OUTPATIENT
Start: 2021-06-01 | End: 2021-06-02 | Stop reason: HOSPADM

## 2021-06-01 RX ORDER — GUAIFENESIN 100 MG/5ML
81 LIQUID (ML) ORAL DAILY
Status: DISCONTINUED | OUTPATIENT
Start: 2021-06-02 | End: 2021-06-02 | Stop reason: HOSPADM

## 2021-06-01 RX ORDER — AMLODIPINE BESYLATE 5 MG/1
10 TABLET ORAL DAILY
Status: DISCONTINUED | OUTPATIENT
Start: 2021-06-02 | End: 2021-06-02

## 2021-06-01 RX ORDER — LIDOCAINE HYDROCHLORIDE 10 MG/ML
INJECTION INFILTRATION; PERINEURAL AS NEEDED
Status: DISCONTINUED | OUTPATIENT
Start: 2021-06-01 | End: 2021-06-01 | Stop reason: HOSPADM

## 2021-06-01 RX ORDER — CLOPIDOGREL BISULFATE 75 MG/1
TABLET ORAL AS NEEDED
Status: DISCONTINUED | OUTPATIENT
Start: 2021-06-01 | End: 2021-06-01 | Stop reason: HOSPADM

## 2021-06-01 RX ORDER — HEPARIN SODIUM 1000 [USP'U]/ML
INJECTION, SOLUTION INTRAVENOUS; SUBCUTANEOUS AS NEEDED
Status: DISCONTINUED | OUTPATIENT
Start: 2021-06-01 | End: 2021-06-01 | Stop reason: HOSPADM

## 2021-06-01 RX ORDER — MORPHINE SULFATE 2 MG/ML
4 INJECTION, SOLUTION INTRAMUSCULAR; INTRAVENOUS ONCE
Status: COMPLETED | OUTPATIENT
Start: 2021-06-01 | End: 2021-06-01

## 2021-06-01 RX ORDER — GUAIFENESIN 100 MG/5ML
81 LIQUID (ML) ORAL ONCE
Status: ACTIVE | OUTPATIENT
Start: 2021-06-01 | End: 2021-06-01

## 2021-06-01 RX ADMIN — Medication 10 ML: at 23:53

## 2021-06-01 RX ADMIN — ACETAMINOPHEN 1000 MG: 500 TABLET ORAL at 13:59

## 2021-06-01 RX ADMIN — ATORVASTATIN CALCIUM 40 MG: 40 TABLET, FILM COATED ORAL at 23:53

## 2021-06-01 RX ADMIN — MORPHINE SULFATE 4 MG: 2 INJECTION, SOLUTION INTRAMUSCULAR; INTRAVENOUS at 13:57

## 2021-06-01 NOTE — Clinical Note
Lesion: Located in the Mid Cx. Stent deployed. Single technique used. First inflation pressure = 12 cathy; inflation time: 10 sec.

## 2021-06-01 NOTE — ROUTINE PROCESS
1500-  Pt in post cath. VSS. Right groin. CDI. 
1800- VSS Pt OOB with max assist x 2, oozing to right groin small amount oozed area circled to pad. Pt denies CP, States back and leg pain much better since OOB. 1930- Report at bedside to [facility] for care.

## 2021-06-01 NOTE — PROGRESS NOTES
Brief Procedure note:         Indication:   Chest pain, Dyspnea, severe LCx disease     Procedure:   Severe LCx disease   PCI of LCx to OM with LAMAR x2   IVUS of LCx   FFR of distal LCx lesion after PCI of mid LCx     Complications: None   Blood loss: Minimal   Condition: Stable     Brief procedure Result:   PCI of mLCx  With LAMAR x1  IFR of distal LCx lesion after PCI of Mid LCx (was significant)   S/p PCI with 2nd LAMAR overlapping first stent      Recommendations:     Cont aspirin and plavix  X 1 months, if needed use low dose (2.5 mg) Eliquis   After 1 month  plavix + Eliquis (5 mg BID)  for 1 year   Cont statin   Post procedure care       Full note and recommendations to follow. '

## 2021-06-01 NOTE — Clinical Note
Lesion located in the Distal Cx. Balloon inserted. Balloon inflated using single inflation technique. Lesion #1: Pressure = 10 cathy; Duration = 15 sec.    OVER DFR WIRE

## 2021-06-01 NOTE — Clinical Note
Lesion located in the Proximal Cx. Balloon inserted. Balloon inflated using multiple inflation technique. Lesion #1: Pressure = 14 cathy; Duration = 28 sec. Inflation 2: Pressure = 14 cathy; Duration = 14 sec.

## 2021-06-01 NOTE — Clinical Note
Lesion located in the Proximal Cx. Balloon inflated using multiple inflation technique. Lesion #1: Pressure = 12 cathy; Duration = 10 sec. Inflation 2: Pressure = 12 cathy; Duration = 8 sec. Inflation 3: Pressure = 12 cathy; Duration = 8 sec.

## 2021-06-01 NOTE — Clinical Note
Lesion: Located in the Proximal Cx. Stent deployed. Single technique used. First inflation pressure = 15 cathy; inflation time: 25 sec.

## 2021-06-01 NOTE — Clinical Note
Single view of the aortic root obtained using power injection. Total volume = 30 mL. Rate = 10 mL/sec. Pressure = 900 PSI.

## 2021-06-01 NOTE — Clinical Note
Sheath #1: sheath exchanged for SHEATH INTRO L25CM PROTUDING L2.5CM ID5FR DIL W/O MINI.  5/10 exchanged for 5/25

## 2021-06-01 NOTE — CARDIO/PULMONARY
Cardiac Rehab Note: chart review/referral  
 
Pt is a 79 yo admitted with chest pain, s/p PCI/LAMAR. Pt with recent CVA with left sided hemiparesis. Smoking history assessed. Patient is a former smoker. Smoking Cessation Program link has not been added to the AVS. EF not available at time of chart review. CAD education folder, with heart heathy diet, warning signs, heart facts, catheterization brochure, and out patient cardiac rehab program provided to Leilanijagdish Hrenadezchristina on 6/1/21. Educated using teach back method. Reviewed CAD diagnosis definition and purpose of intervention. Discussed risk factors for CAD to include the following: family history, elevated BMI, hyperlipidemia, hypertension, diabetes, and stress. Discussed Heart Healthy/Low Sodium (less than 2000 mg) diet. Reviewed the importance of medication compliance, follow up appointments with cardiologist, signs and symptoms of angina, and what to report to physician after discharge. Emphasized the value of cardiac rehab. Discussed Cardiac Rehab Program format, benefits, and encouraged enrollment to assist with risk modification and management. Pt currently receiving HH services secondary to CVA. Pt needs walker and 1-2 person assist with ambulation, has wheelchair to assist with mobility. Wife would like to have pt re-evaluated and be placed at an [address] [place] [address] facility. CM notified of request.  Per CM pt would need MD order to begin process. Primary nurse informed of wife's concern about pt needing a higher level of care at home. Leilani Mcelroy verbalized understanding with questions answered.   Claire Ma RN

## 2021-06-01 NOTE — Clinical Note
TRANSFER - OUT REPORT:     Verbal report given to: christ. Report consisted of patient's Situation, Background, Assessment and   Recommendations(SBAR). Opportunity for questions and clarification was provided. Patient transported to: [facility].

## 2021-06-01 NOTE — Clinical Note
Lesion located in the Mid Cx and Distal Cx. Balloon inserted. Balloon inflated using multiple inflation technique. Lesion #1: Pressure = 12 cathy; Duration = 6 sec. Inflation 2: Pressure = 12 cathy; Duration = 8 sec.

## 2021-06-01 NOTE — PROCEDURES
Procedure: Cardiac Catheterization. Date: 6/1/2021   Moderate sedation start time: 1016  Moderate sedation stop time: 1117  Sedation used: versed/fentanyl. Sedation was administered by a cath lab nurse; I directly supervised the cath lab staff as the patient was monitored for the duration of the procedure. INDICATION/PreDx: CAD; prior CABG; known occlusion SVGs; Dyspnea. PROCEDURES PERFORMED   1. Left heart catheterization. 2. Left ventriculography in LINDSEY projection. 3. Selective coronary angiography. 4. Bypasss angiography. 5. Ascending aortography. DESCRIPTION OF PROCEDURE: After informed consent of all potential complications, the patient was prepped and draped in the usual sterile manner. Lidocaine 1% was utilized for local anesthesia. Conscious sedation per flow sheet. The right femoral artery was entered and a 5-Sudanese sheath placed without complication using modified Seldinger technique. The sheath was flushed at all catheter exchanges and all catheters were advanced over a guidewire under direct fluoroscopic visualization. Due to tortuosity of iliacs/distal Ao, a 6Fr 45cm sheath was needed; Left coronary angiography was performed in multiple views using a 5-Sudanese JL4 catheter. Right coronary angiography was performed in multiple views using a JR4 catheter. Bypass angiography was performed using JR4, DAYTON and MP2 catheters. Left heart catheterization and left ventriculography was performed in LINDSEY projection using a 5-Sudanese straight pigtail. No apparent complications. Estimated blood loss minimal. SPECIMENS: No specimens. No implants. FINDINGS : calcified coronary arteries  LEFT MAIN:   Large vessel; minor luminal irregularities,. LAD: Large, ectatic vessel; Trivial D1, very small D2 w/ 95% diffuse disease at proximal bifurcation. Mid LAD occlusion; 75% apical LAD disease (seen in DAYTON injection). CIRCUMFLEX: Large vessel; large extensive mid-OM with 85% stenosis;  Trivial AV groove circ. RCA: Large, dominant vessel; 50% proximal stenosis; scattered 30-40% plaque throughout. 60% distal disease @ PDA origin; Very small PDA and small posterolateral vessels. LV Gram: Basal inf HK; EF 50-55%. No mitral regurgitation; no aortic stenosis on pullback. EDP 5. DAYTON-LAD: widely patent; good anastomosis. SVG-OM & SVG-PDA: both with proximal occlusion. Aortogram, ascending: No dissection nor aneurysm; No AI. CONCLUSION/post procedure Dx:   1. Low-normal LV function. 2. Severe native CAD as noted above. 3. Occluded SVGs; widely patent DAYTON-LAD. PLAN: PCI of OM ongoing; Med Rx apical LAD, Ds, & distal RCA.

## 2021-06-01 NOTE — Clinical Note
Lesion located in the Distal Cx. Balloon inflated using multiple inflation technique. Lesion #1: Pressure = 12 cathy; Duration = 12 sec. Inflation 2: Pressure = 8 cathy; Duration = 6 sec.

## 2021-06-02 VITALS
SYSTOLIC BLOOD PRESSURE: 132 MMHG | HEART RATE: 69 BPM | TEMPERATURE: 97.2 F | WEIGHT: 248 LBS | BODY MASS INDEX: 31.83 KG/M2 | HEIGHT: 74 IN | OXYGEN SATURATION: 100 % | DIASTOLIC BLOOD PRESSURE: 79 MMHG | RESPIRATION RATE: 16 BRPM

## 2021-06-02 PROCEDURE — 74011250637 HC RX REV CODE- 250/637: Performed by: INTERNAL MEDICINE

## 2021-06-02 PROCEDURE — 74011250637 HC RX REV CODE- 250/637: Performed by: STUDENT IN AN ORGANIZED HEALTH CARE EDUCATION/TRAINING PROGRAM

## 2021-06-02 RX ORDER — AMLODIPINE BESYLATE 5 MG/1
5 TABLET ORAL DAILY
COMMUNITY

## 2021-06-02 RX ORDER — TRAMADOL HYDROCHLORIDE 50 MG/1
50 TABLET ORAL
COMMUNITY

## 2021-06-02 RX ORDER — FENOFIBRATE 134 MG/1
134 CAPSULE ORAL DAILY
COMMUNITY

## 2021-06-02 RX ORDER — LOSARTAN POTASSIUM 100 MG/1
100 TABLET ORAL DAILY
COMMUNITY

## 2021-06-02 RX ORDER — AMLODIPINE BESYLATE 5 MG/1
5 TABLET ORAL DAILY
Status: DISCONTINUED | OUTPATIENT
Start: 2021-06-02 | End: 2021-06-02 | Stop reason: HOSPADM

## 2021-06-02 RX ORDER — ATORVASTATIN CALCIUM 20 MG/1
20 TABLET, FILM COATED ORAL DAILY
Qty: 30 TABLET | Refills: 12 | Status: SHIPPED | OUTPATIENT
Start: 2021-06-02

## 2021-06-02 RX ORDER — HYDROCHLOROTHIAZIDE 25 MG/1
25 TABLET ORAL DAILY
COMMUNITY

## 2021-06-02 RX ORDER — ATORVASTATIN CALCIUM 20 MG/1
20 TABLET, FILM COATED ORAL
Status: DISCONTINUED | OUTPATIENT
Start: 2021-06-02 | End: 2021-06-02 | Stop reason: HOSPADM

## 2021-06-02 RX ORDER — CLOPIDOGREL BISULFATE 75 MG/1
75 TABLET ORAL DAILY
Qty: 30 TABLET | Refills: 12 | Status: SHIPPED | OUTPATIENT
Start: 2021-06-02 | End: 2021-06-10

## 2021-06-02 RX ADMIN — AMLODIPINE BESYLATE 5 MG: 5 TABLET ORAL at 08:31

## 2021-06-02 RX ADMIN — CLOPIDOGREL BISULFATE 75 MG: 75 TABLET ORAL at 08:31

## 2021-06-02 RX ADMIN — ASPIRIN 81 MG CHEWABLE TABLET 81 MG: 81 TABLET CHEWABLE at 08:31

## 2021-06-02 RX ADMIN — Medication 10 ML: at 07:50

## 2021-06-02 NOTE — DISCHARGE SUMMARY
6/2/2021 8:11 AM  Patient without complaints. Subjective: Caro Benjamin reports   Chest pain X none  consistent with:  Non-cardiac CP         Atypical CP     None now  On going  Anginal CP     Dyspnea X none  at rest  with exertion         improved  unchanged  worse              PND X none  overnight       Orthopnea X none  improved  unchanged  worse   Presyncope X none  improved  unchanged  worse     Ambulated in hallway without symptoms   Yes   Ambulated in room without symptoms  Yes     xPE: Last VS:   Visit Vitals  /79   Pulse 69   Temp 97.2 °F (36.2 °C)   Resp 16   Ht 6' 2\" (1.88 m)   Wt 112.5 kg (248 lb)   SpO2 100%   BMI 31.84 kg/m²     CTA, normal resp effort  RRR no new murmur  abd benign  A/O, non-focal    Cath site without hematoma, bleeding or new bruit. Distal pulses at baseline. Telemetry independently reviewed x sinus  chronic afib  parox afib  NSVT     ECG independently reviewed  NSR  afib  no significant changes  NSST-Tw chgs   x no new ECG provided for review     Plan: D/C. Cont aspirin and plavix  X 1 month, add low dose (2.5 mg) Eliquis after PPM (planned for 6/10/21). After 1 month, stop asa & cont  plavix + Eliquis (5 mg BID)  for 1 year       Follow-up Information     Follow up With Specialties Details Why Contact Info    Jhoana Gunter MD Family Medicine   [address] [place] [address] [phone]      Jessica Abrams MD Cardiology In 1 month  [address]  [id number]  [address] [address] [phone]      Melina Cardenas MD Cardiology  for PM 6/10/21 as planned. [address]  [address] [place] [address]  [phone]            Current Discharge Medication List      START taking these medications    Details   clopidogreL (PLAVIX) 75 mg tab Take 1 Tablet by mouth daily.   Qty: 30 Tablet, Refills: 12         CONTINUE these medications which have CHANGED    Details   atorvastatin (LIPITOR) 20 mg tablet Take 1 Tablet by mouth daily.  Qty: 30 Tablet, Refills: 12         CONTINUE these medications which have NOT CHANGED    Details   losartan (COZAAR) 100 mg tablet Take 100 mg by mouth daily. amLODIPine (Norvasc) 5 mg tablet Take 5 mg by mouth daily. hydroCHLOROthiazide (HYDRODIURIL) 25 mg tablet Take 25 mg by mouth daily. traMADoL (ULTRAM) 50 mg tablet Take 50 mg by mouth every six (6) hours as needed for Pain. fenofibrate micronized (LOFIBRA) 134 mg capsule Take 134 mg by mouth daily. baclofen (LIORESAL) 10 mg tablet Take  by mouth three (3) times daily. doxazosin (CARDURA) 4 mg tablet Take 4 mg by mouth daily. ASPIRIN PO Take 81 mg by mouth daily.          STOP taking these medications       apixaban (Eliquis) 5 mg tablet Comments:   Reason for Stopping:

## 2021-06-02 NOTE — PROGRESS NOTES
Bedside shift change report given to Sammy Guzman RN (oncoming nurse) by Brett Ruiz RN (offgoing nurse). Report included the following information SBAR, Kardex, Procedure Summary, Intake/Output, MAR, Accordion, Recent Results, Med Rec Status, Cardiac Rhythm NSR, Sinus Renfrow Rocker, BBB, Alarm Parameters , Pre Procedure Checklist, Procedure Verification, Quality Measures and Dual Neuro Assessment.

## 2021-06-02 NOTE — PROGRESS NOTES
Bedside shift change report given to Harriet Gay RN (oncoming nurse) by Sherel Angelucci., RN (offgoing nurse). Report included the following information SBAR, Kardex, Procedure Summary, Intake/Output, MAR, Accordion, Recent Results, Med Rec Status, Cardiac Rhythm NSR, Sinus Martinez Adam, BBB, Alarm Parameters , Pre Procedure Checklist, Procedure Verification, Quality Measures and Dual Neuro Assessment.

## 2021-06-02 NOTE — DISCHARGE INSTRUCTIONS
[address]    [phone]  [place], [place] [address]    [email or web address]    Patient Discharge Instructions    Mara Gilbert / [id number] : 1950    Admitted 2021 Discharged 2021     · It is important that you take the medication exactly as they are prescribed. · Keep your medication in the bottles provided by the pharmacist and keep a list of the medication names, dosages, and times to be taken in your wallet. · Do not take other medications without consulting your doctor. BRING ALL OF YOUR MEDICINES or a list of medicines with dosages TO YOUR OFFICE VISIT with Dr. Keith Hein. Hold Eliquis until after PM with Dr Darcie Rivera: he will tell you when to restart eliquis and at what dose (likely 2.5 mg am and pm, while taking aspirin and plavix). Cardiac Catheterization  Discharge Instructions  Transfemoral Catheterization Discharge Instructions Billie Blackman)     Do not drive, operate any machinery, or sign any legal documents for 24 hours after your procedure. You must have someone to drive you home.  You may take a shower 24 hours after your cardiac catheterization. Be sure to get the dressing wet and then remove it; gently wash the area with warm soapy water. Pat dry and leave open to air. To help prevent infections, be sure to keep the cath site clean and dry. No lotions, creams, powders, ointments, etc. in the cath site for approximately 1 week.  Do not take a tub bath, get in a hot tub or swimming pool for approximately 5 days or until the cath site is completely healed.  No strenuous activity or heavy lifting over 10 lbs. for 7 days.  Drink plenty of fluids for 24-48 hours after your cath to flush the contrast dye from your kidneys. No alcoholic beverages for 24 hours. You may resume your previous diet (low fat, low cholesterol) after your cath.  After your cath, some bruising or discomfort is common during the healing process.   Tylenol, 1-2 tablets every 6 hours as needed, is recommended if you experience any discomfort. If you experience any signs or symptoms of infection such as fever, chills, or poorly healing incision, persistent tenderness or swelling in the groin, redness and/or warmth to the touch, numbness, significant tingling or pain at the groin site or affected extremity, rash, drainage from the cath site, or if the leg feels tight or swollen, call your physician right away.  If bleeding at the cath site occurs, take a clean gauze pad and apply direct pressure to the groin just above the puncture site. Call 911 immediately, and continue to apply direct pressure until an ambulance gets to your location.  You may return to work  2  days after your cardiac cath if no bleeding at the cath site. If Plavix, or Brilinta, or Effient is prescribed with your aspirin, you must take them to prevent your stent from clotting. You will likely need them for at least 1 to 2 years. If you are smoking, please stop. Smoking Cessation Program is a free, phone/text/email based, smoking cessation program. The program is individualized to meet each patient's needs. To enroll use this link https://[email or web address]/ra/survey/2860      Follow-up:   Follow-up Information     Follow up With Specialties Details Why Contact Info    Cody Beckwith MD Family Medicine   [place] [address]  [phone]      Lelo Roa MD Cardiology In 1 month  [address]  [id number]  [address] [address] [phone]      Rose Marie Johnson MD Cardiology  for PM 6/10/21 as planned. [address]  [address]  [facility]  [phone]            Information obtained by :  I understand that if any problems occur once I am at home I am to contact my physician. I understand and acknowledge receipt of the instructions indicated above. DILLON's Signature                                                                  Date/Time                                                                                                                                              Patient or Representative Signature                                                          Date/Time      Faina Rod MD      [address]    [phone]  [place], [address] [facility]    [email or web address]

## 2021-06-06 ENCOUNTER — HOSPITAL ENCOUNTER (OUTPATIENT)
Dept: PREADMISSION TESTING | Age: 71
Discharge: HOME | End: 2021-06-06
Payer: MEDICARE

## 2021-06-06 LAB — SARS-COV-2, COV2: NORMAL

## 2021-06-06 PROCEDURE — U0003 INFECTIOUS AGENT DETECTION BY NUCLEIC ACID (DNA OR RNA); SEVERE ACUTE RESPIRATORY SYNDROME CORONAVIRUS 2 (SARS-COV-2) (CORONAVIRUS DISEASE [COVID-19]), AMPLIFIED PROBE TECHNIQUE, MAKING USE OF HIGH THROUGHPUT TECHNOLOGIES AS DESCRIBED BY CMS-2020-01-R: HCPCS

## 2021-06-07 LAB — SARS-COV-2, COV2NT: NOT DETECTED

## 2021-06-10 ENCOUNTER — ANESTHESIA (OUTPATIENT)
Dept: CARDIAC CATH/INVASIVE PROCEDURES | Age: 71
End: 2021-06-10
Payer: MEDICARE

## 2021-06-10 ENCOUNTER — HOSPITAL ENCOUNTER (OUTPATIENT)
Age: 71
Discharge: HOME | End: 2021-06-10
Attending: INTERNAL MEDICINE | Admitting: INTERNAL MEDICINE
Payer: MEDICARE

## 2021-06-10 ENCOUNTER — ANESTHESIA EVENT (OUTPATIENT)
Dept: CARDIAC CATH/INVASIVE PROCEDURES | Age: 71
End: 2021-06-10
Payer: MEDICARE

## 2021-06-10 ENCOUNTER — APPOINTMENT (OUTPATIENT)
Dept: GENERAL RADIOLOGY | Age: 71
End: 2021-06-10
Attending: INTERNAL MEDICINE
Payer: MEDICARE

## 2021-06-10 VITALS
RESPIRATION RATE: 16 BRPM | HEIGHT: 74 IN | WEIGHT: 248 LBS | SYSTOLIC BLOOD PRESSURE: 124 MMHG | OXYGEN SATURATION: 98 % | BODY MASS INDEX: 31.83 KG/M2 | TEMPERATURE: 97.5 F | DIASTOLIC BLOOD PRESSURE: 50 MMHG | HEART RATE: 67 BPM

## 2021-06-10 DIAGNOSIS — I49.5 SSS (SICK SINUS SYNDROME) (HCC): ICD-10-CM

## 2021-06-10 PROCEDURE — C1898 LEAD, PMKR, OTHER THAN TRANS: HCPCS | Performed by: INTERNAL MEDICINE

## 2021-06-10 PROCEDURE — 77030039266 HC ADH SKN EXOFIN S2SG -A: Performed by: INTERNAL MEDICINE

## 2021-06-10 PROCEDURE — 74011000250 HC RX REV CODE- 250: Performed by: INTERNAL MEDICINE

## 2021-06-10 PROCEDURE — 76060000034 HC ANESTHESIA 1.5 TO 2 HR: Performed by: INTERNAL MEDICINE

## 2021-06-10 PROCEDURE — 74011000258 HC RX REV CODE- 258: Performed by: NURSE ANESTHETIST, CERTIFIED REGISTERED

## 2021-06-10 PROCEDURE — 77030031139 HC SUT VCRL2 J&J -A: Performed by: INTERNAL MEDICINE

## 2021-06-10 PROCEDURE — 77030039825 HC MSK NSL PAP SUPERNO2VA VYRM -B: Performed by: ANESTHESIOLOGY

## 2021-06-10 PROCEDURE — 74011000272 HC RX REV CODE- 272: Performed by: INTERNAL MEDICINE

## 2021-06-10 PROCEDURE — C1785 PMKR, DUAL, RATE-RESP: HCPCS | Performed by: INTERNAL MEDICINE

## 2021-06-10 PROCEDURE — 77030018729 HC ELECTRD DEFIB PAD CARD -B: Performed by: INTERNAL MEDICINE

## 2021-06-10 PROCEDURE — 71045 X-RAY EXAM CHEST 1 VIEW: CPT

## 2021-06-10 PROCEDURE — C1894 INTRO/SHEATH, NON-LASER: HCPCS | Performed by: INTERNAL MEDICINE

## 2021-06-10 PROCEDURE — 74011250637 HC RX REV CODE- 250/637: Performed by: INTERNAL MEDICINE

## 2021-06-10 PROCEDURE — 74011250636 HC RX REV CODE- 250/636: Performed by: INTERNAL MEDICINE

## 2021-06-10 PROCEDURE — C1892 INTRO/SHEATH,FIXED,PEEL-AWAY: HCPCS | Performed by: INTERNAL MEDICINE

## 2021-06-10 PROCEDURE — 77030002996 HC SUT SLK J&J -A: Performed by: INTERNAL MEDICINE

## 2021-06-10 PROCEDURE — 74011250636 HC RX REV CODE- 250/636: Performed by: NURSE ANESTHETIST, CERTIFIED REGISTERED

## 2021-06-10 PROCEDURE — 33208 INSRT HEART PM ATRIAL & VENT: CPT | Performed by: INTERNAL MEDICINE

## 2021-06-10 DEVICE — PACING LEAD
Type: IMPLANTABLE DEVICE | Status: FUNCTIONAL
Brand: TENDRIL™

## 2021-06-10 DEVICE — DR PACEMAKER DDDR
Type: IMPLANTABLE DEVICE | Status: FUNCTIONAL
Brand: ASSURITY MRI™

## 2021-06-10 RX ORDER — FENTANYL CITRATE 50 UG/ML
INJECTION, SOLUTION INTRAMUSCULAR; INTRAVENOUS AS NEEDED
Status: DISCONTINUED | OUTPATIENT
Start: 2021-06-10 | End: 2021-06-10 | Stop reason: HOSPADM

## 2021-06-10 RX ORDER — ACETAMINOPHEN 325 MG/1
650 TABLET ORAL
Status: DISCONTINUED | OUTPATIENT
Start: 2021-06-10 | End: 2021-06-10 | Stop reason: HOSPADM

## 2021-06-10 RX ORDER — HEPARIN SODIUM 200 [USP'U]/100ML
INJECTION, SOLUTION INTRAVENOUS
Status: COMPLETED | OUTPATIENT
Start: 2021-06-10 | End: 2021-06-10

## 2021-06-10 RX ORDER — CEFAZOLIN SODIUM 1 G/3ML
INJECTION, POWDER, FOR SOLUTION INTRAMUSCULAR; INTRAVENOUS AS NEEDED
Status: DISCONTINUED | OUTPATIENT
Start: 2021-06-10 | End: 2021-06-10 | Stop reason: HOSPADM

## 2021-06-10 RX ORDER — PROPOFOL 10 MG/ML
INJECTION, EMULSION INTRAVENOUS
Status: DISCONTINUED | OUTPATIENT
Start: 2021-06-10 | End: 2021-06-10 | Stop reason: HOSPADM

## 2021-06-10 RX ORDER — LIDOCAINE HYDROCHLORIDE AND EPINEPHRINE 10; 10 MG/ML; UG/ML
INJECTION, SOLUTION INFILTRATION; PERINEURAL AS NEEDED
Status: DISCONTINUED | OUTPATIENT
Start: 2021-06-10 | End: 2021-06-10 | Stop reason: HOSPADM

## 2021-06-10 RX ORDER — PROPOFOL 10 MG/ML
INJECTION, EMULSION INTRAVENOUS AS NEEDED
Status: DISCONTINUED | OUTPATIENT
Start: 2021-06-10 | End: 2021-06-10 | Stop reason: HOSPADM

## 2021-06-10 RX ORDER — SODIUM CHLORIDE 9 MG/ML
INJECTION, SOLUTION INTRAVENOUS
Status: DISCONTINUED | OUTPATIENT
Start: 2021-06-10 | End: 2021-06-10 | Stop reason: HOSPADM

## 2021-06-10 RX ORDER — MIDAZOLAM HYDROCHLORIDE 1 MG/ML
INJECTION, SOLUTION INTRAMUSCULAR; INTRAVENOUS AS NEEDED
Status: DISCONTINUED | OUTPATIENT
Start: 2021-06-10 | End: 2021-06-10 | Stop reason: HOSPADM

## 2021-06-10 RX ADMIN — PROPOFOL 50 MCG/KG/MIN: 10 INJECTION, EMULSION INTRAVENOUS at 10:05

## 2021-06-10 RX ADMIN — ACETAMINOPHEN 650 MG: 325 TABLET ORAL at 08:09

## 2021-06-10 RX ADMIN — ACETAMINOPHEN 650 MG: 325 TABLET ORAL at 12:09

## 2021-06-10 RX ADMIN — SODIUM CHLORIDE: 9 INJECTION, SOLUTION INTRAVENOUS at 09:58

## 2021-06-10 RX ADMIN — FENTANYL CITRATE 25 MCG: 50 INJECTION, SOLUTION INTRAMUSCULAR; INTRAVENOUS at 10:33

## 2021-06-10 RX ADMIN — PROPOFOL 20 MG: 10 INJECTION, EMULSION INTRAVENOUS at 10:33

## 2021-06-10 RX ADMIN — FENTANYL CITRATE 25 MCG: 50 INJECTION, SOLUTION INTRAMUSCULAR; INTRAVENOUS at 10:08

## 2021-06-10 RX ADMIN — FENTANYL CITRATE 25 MCG: 50 INJECTION, SOLUTION INTRAMUSCULAR; INTRAVENOUS at 10:18

## 2021-06-10 RX ADMIN — MIDAZOLAM HYDROCHLORIDE 1 MG: 1 INJECTION, SOLUTION INTRAMUSCULAR; INTRAVENOUS at 10:03

## 2021-06-10 RX ADMIN — CEFAZOLIN 2 G: 1 INJECTION, POWDER, FOR SOLUTION INTRAMUSCULAR; INTRAVENOUS; PARENTERAL at 10:15

## 2021-06-10 RX ADMIN — MIDAZOLAM HYDROCHLORIDE 1 MG: 1 INJECTION, SOLUTION INTRAMUSCULAR; INTRAVENOUS at 10:06

## 2021-06-10 RX ADMIN — PROPOFOL 20 MG: 10 INJECTION, EMULSION INTRAVENOUS at 10:04

## 2021-06-10 RX ADMIN — FENTANYL CITRATE 25 MCG: 50 INJECTION, SOLUTION INTRAMUSCULAR; INTRAVENOUS at 11:06

## 2021-06-10 NOTE — DISCHARGE INSTRUCTIONS
DISCHARGE INSTRUCTIONS FOR PATIENTS WITH PACEMAKERS    You had a dual chamber pacemaker implanted by Dr. Rita Baron at the left chest.    Please restart the Eliquis tomorrow morning on 6/11. No changes to your medications otherwise. 1. Remember to call for an appointment in 2-4 weeks [phone] to check healing and implant programming. 2. Medic Alert Bracelets are available from your pharmacist to wear at all times if you choose to wear one. 3. Carry your ID card for pacemaker with you at all times. This card will be given to you in the hospital or mailed to you. 4. The pacemaker will bulge slightly under your skin. The bulge will decrease in size over the next few weeks. Please notify the doctor's office if you notice any of the following around your site:   A.  A bruise that does not go away. B.  Soreness or yellow, green, or brown drainage from the site. C. Any swelling from the site. D. If you have a fever of 100 degrees or higher that lasts for a few days. INCISION CARE       1.  Leave skin glue over your site until it starts to fall off, usually in a few weeks. 2.  You may shower after 3 days as long as your incision isnt submerged or directly sprayed upon until well healed. 3.  For comfort, wear loose fitting clothing. 4.  Report any signs of infection, fever, pain, swelling, redness, oozing, or heat at site especially if these symptoms increase after the first 3 to 4 days. ACTIVITY PRECAUTIONS     1. Avoid rough contact with the implant site. 2. No driving for 14 days. 3. Avoid lifting your arm over your head, carrying anything on the affected side, or lifting over 10 pounds for 30 days. For the first 2 days only bend your arm at the elbow. 4. Any extreme activity such as golf, weight lifting or exercise biking should be restricted for 60 days. 5. Do not carry objects by holding them against your implant site.    6.  No shooting rifles or any type of gun with the affected shoulder permanently. SPECIAL PRECAUTIONS     1. You should avoid all strong magnetic fields, such as arc welding, large transformers, large motors. 2.  You may not have an MRI which uses a strong magnet to take pictures. 3.  Treatments or surgery that requires diathermy or electrocautery should be discussed with your doctor before scheduled. 4. Avoid radio frequency transmitters, including radar. 5. Advise dentist or other medical personnel you see that you have a pacemaker. 6.  Cell phones and microwave oven use is okay. 7.  If you plan to move or take a trip to a new area, the doctor's office will give you a name of a doctor to contact for any problems. ANTIBIOTIC THERAPY    During the first 8 weeks after your pacemaker insertion, you may need antibiotics before any dental work or certain tests or operations. Let the dentist or doctor who is caring for you know that you have had an implanted device.       Signed By: Kia Reardon MD     Hannah 10, 2021

## 2021-06-10 NOTE — Clinical Note
TRANSFER - IN REPORT:     Verbal report received from: [facility] Recovery Area. Report consisted of patient's Situation, Background, Assessment and   Recommendations(SBAR). Opportunity for questions and clarification was provided. Assessment completed upon patient's arrival to unit and care assumed. Patient transported with a Registered Nurse.

## 2021-06-10 NOTE — PROGRESS NOTES
S/p dual chamber SJM pacemaker at the left chest, no complications. The RV lead was positioned at the mid RV septum due to low amplitude R waves at the apex.

## 2021-06-10 NOTE — Clinical Note
TRANSFER - OUT REPORT:     Verbal report given to: Danisha Rodarte (at bedside). Report consisted of patient's Situation, Background, Assessment and   Recommendations(SBAR). Opportunity for questions and clarification was provided. [facility]

## 2021-06-10 NOTE — PROGRESS NOTES
TRANSFER - OUT REPORT:    Verbal report given to Armida Chakraborty RN(name) on Marcos Bullock  being transferred to [facility](unit) for routine progression of care       Report consisted of patients Situation, Background, Assessment and   Recommendations(SBAR). Information from the following report(s) SBAR and Procedure Summary was reviewed with the receiving nurse. Lines:       Opportunity for questions and clarification was provided.       Patient transported with:   Registered Nurse

## 2021-06-10 NOTE — ANESTHESIA POSTPROCEDURE EVALUATION
Procedure(s):  INSERT PPM DUAL. MAC    Anesthesia Post Evaluation        Patient location during evaluation: PACU  Note status: Adequate. Level of consciousness: responsive to verbal stimuli and sleepy but conscious  Pain management: satisfactory to patient  Airway patency: patent  Anesthetic complications: no  Cardiovascular status: acceptable  Respiratory status: acceptable  Hydration status: acceptable  Comments: +Post-Anesthesia Evaluation and Assessment    Patient: Arielle Hudson MRN: [id number]  SSN: [id number]   YOB: 1950  Age: 80 y.o. Sex: male      Cardiovascular Function/Vital Signs    BP (!) 123/48   Pulse 71   Temp 36.4 °C (97.6 °F)   Resp 22   Ht 6' 2\" (1.88 m)   Wt 112.5 kg (248 lb)   SpO2 98%   BMI 31.84 kg/m²     Patient is status post Procedure(s):  INSERT PPM DUAL. Nausea/Vomiting: Controlled. Postoperative hydration reviewed and adequate. Pain:  Pain Scale 1: Numeric (0 - 10) (06/10/21 1121)  Pain Intensity 1: 0 (06/10/21 1121)   Managed. Neurological Status: At baseline. Mental Status and Level of Consciousness: Arousable. Pulmonary Status:   O2 Device: Nasal cannula (06/10/21 1136)   Adequate oxygenation and airway patent. Complications related to anesthesia: None    Post-anesthesia assessment completed. No concerns. Signed By: Andrei Clark MD    6/10/2021  Post anesthesia nausea and vomiting:  controlled      INITIAL Post-op Vital signs:   Vitals Value Taken Time   /48 06/10/21 1136   Temp 36.4 °C (97.6 °F) 06/10/21 1136   Pulse 64 06/10/21 1138   Resp 16 06/10/21 1138   SpO2 98 % 06/10/21 1137   Vitals shown include unvalidated device data.

## 2021-06-10 NOTE — PROGRESS NOTES
TRANSFER - IN REPORT:    Verbal report received from Brooklyn Villarreal Rn and FEDERICO Thomas CRNA on [facility]  being received from EP for routine progression of care. Report consisted of patients Situation, Background, Assessment and Recommendations(SBAR). Information from the following report(s) Procedure Summary and MAR was reviewed with the receiving clinician. Opportunity for questions and clarification was provided. Assessment completed upon patients arrival to [address] [place] [address] and care assumed. Cardiac Cath Lab Recovery Arrival Note:    [facility] arrived to [facility] recovery area. Patient procedure= PPM. Patient on cardiac monitor, non-invasive blood pressure, SPO2 monitor. On O2 @ 2 lpm via [place]. Patient status doing well without problems. Patient is A&Ox 4. Patient reports back pain 8/10. PROCEDURE SITE CHECK:    Procedure site:without any bleeding and no hematoma, no pain/discomfort reported at procedure site. No change in patient status. Continue to monitor patient and status.

## 2021-06-10 NOTE — PROGRESS NOTES
Cardiac Cath Lab Recovery Arrival Note:      Larisa Jackson arrived to Cardiac Cath Lab, Recovery Area. Staff introduced to patient. Patient identifiers verified with NAME and DATE OF BIRTH. Procedure verified with patient. Consent forms reviewed and signed by patient or authorized representative and verified. Allergies verified. Patient and family oriented to department. Patient and family informed of procedure and plan of care. Questions answered with review. Patient prepped for procedure, per orders from physician, prior to arrival.    Patient on cardiac monitor, non-invasive blood pressure, SPO2 monitor. On room air. Patient is A&Ox 3. Patient reports no c/o. Patient in stretcher, in low position, with side rails up, call bell within reach, patient instructed to call if assistance as needed. Patient prep in: [address], [place] [address]. Patient family has pager # none  Family in: [address] [facility] waiting area.    Prep by: Tee Shannon RN and Iris Parrish RN

## 2021-06-10 NOTE — Clinical Note
TRANSFER - OUT REPORT:     Verbal report given to: [facility] Recovery Area, (at bedside). Report consisted of patient's Situation, Background, Assessment and   Recommendations(SBAR). Opportunity for questions and clarification was provided. Patient transported with a Registered Nurse and Oxygen. Oxygen used for patient = nasal cannula, @ 2 - 6 Liters. Patient transported to: [address]    transported to [address] [place] [address] Rd for PACU recovery/ transported with CRNA and EP Lab staff

## 2021-06-10 NOTE — ANESTHESIA PREPROCEDURE EVALUATION
Relevant Problems   No relevant active problems       Anesthetic History   No history of anesthetic complications            Review of Systems / Medical History  Patient summary reviewed, nursing notes reviewed and pertinent labs reviewed    Pulmonary          Smoker    Pertinent negatives: No sleep apnea     Neuro/Psych       CVA       Cardiovascular    Hypertension          CAD ( s/p cabg; denies angina ), cardiac stents and CABG  Pertinent negatives: No angina  Exercise tolerance: >4 METS     GI/Hepatic/Renal  Within defined limits           Pertinent negatives: No GERD   Endo/Other  Within defined limits      Obesity     Other Findings              Physical Exam    Airway  Mallampati: I  TM Distance: 4 - 6 cm  Neck ROM: normal range of motion   Mouth opening: Normal     Cardiovascular    Rhythm: regular  Rate: abnormal         Dental    Dentition: Full upper dentures and Full lower dentures     Pulmonary  Breath sounds clear to auscultation               Abdominal  Abdominal exam normal       Other Findings            Anesthetic Plan    ASA: 3  Anesthesia type: MAC and total IV anesthesia          Induction: Intravenous  Anesthetic plan and risks discussed with: Patient

## 2021-06-13 NOTE — H&P
EP/ ARRHYTHMIA Preprocedure Note    Patient ID:  Patient: Anda Eisenmenger  MRN: [id number]  Age: 80 y.o.  : 1950    Date of  Admission: 6/10/2021  6:34 AM   PCP:  Manpreet Patiño MD    Assessment: 1. Sick sinus syndrome with nonreversible symptomatic bradycardia. 2. See office note. Plan:     1. Given the potential benefits, risks, and alternatives, he agrees to proceed with permanent pacemaker implant. A dual chamber device will be needed. Anda Eisenmenger is a 80 y.o. male with a history of the above here for his procedure.     Past Medical History:   Diagnosis Date    Arthritis     CAD (coronary artery disease)     GERD (gastroesophageal reflux disease)     resolved after wt loss    Hypercholesterolemia     Hypertension     Ill-defined condition     intentional wt loss - 125 pounds    Ill-defined condition     spinal stenosis - pain is better since wt loss    Morbid obesity ([facility] [address]         Past Surgical History:   Procedure Laterality Date    COLONOSCOPY N/A 2020    COLONOSCOPY awaiting performed by Sumit Enrique MD at [facility]  2016    \"failed\" per pt    HX UROLOGICAL      1 kidney removed d/t cyst and spot - no cancer    [place] CARDIAC SURG PROCEDURE UNLIST  2013    3 bypass card       Social History     Tobacco Use    Smoking status: Former Smoker     Packs/day: 0.25     Years: 30.00     Pack years: 7.50    Smokeless tobacco: Never Used    Tobacco comment: 7-8 cigarettes a day./quit a couple of weeks ago per pt on 2020   Substance Use Topics    Alcohol use: No        Family History   Problem Relation Age of Onset    Cancer Mother         Stomach Cancer    Heart Disease Father     Stroke Father     Cancer Sister         spot on brain/lung cancer    No Known Problems Brother     No Known Problems Brother     No Known Problems Brother     No Known Problems Sister     HIV/AIDS Brother     Other Brother pancreatitis        Allergies   Allergen Reactions    Percocet [Oxycodone-Acetaminophen] Other (comments)          No current facility-administered medications for this encounter. Current Outpatient Medications   Medication Sig    apixaban (Eliquis) 5 mg tablet Take 1 Tablet by mouth two (2) times a day.  amLODIPine (Norvasc) 5 mg tablet Take 5 mg by mouth daily.  hydroCHLOROthiazide (HYDRODIURIL) 25 mg tablet Take 25 mg by mouth daily.  atorvastatin (LIPITOR) 20 mg tablet Take 1 Tablet by mouth daily.  baclofen (LIORESAL) 10 mg tablet Take  by mouth three (3) times daily.  doxazosin (CARDURA) 4 mg tablet Take 4 mg by mouth daily.  ASPIRIN PO Take 81 mg by mouth daily.  losartan (COZAAR) 100 mg tablet Take 100 mg by mouth daily.  traMADoL (ULTRAM) 50 mg tablet Take 50 mg by mouth every six (6) hours as needed for Pain.  fenofibrate micronized (LOFIBRA) 134 mg capsule Take 134 mg by mouth daily. Review of Symptoms:  Noncontributory. Objective:      Physical Exam:  Vitals stable. Nondiaphoretic, not in acute distress. L chest site OK. Neuro abnormal.  Awake and appropriate.         Andrew Conde MD

## 2021-08-13 ENCOUNTER — APPOINTMENT (OUTPATIENT)
Dept: CT IMAGING | Age: 71
End: 2021-08-13
Attending: EMERGENCY MEDICINE
Payer: COMMERCIAL

## 2021-08-13 ENCOUNTER — HOSPITAL ENCOUNTER (EMERGENCY)
Age: 71
Discharge: HOME | End: 2021-08-13
Attending: EMERGENCY MEDICINE
Payer: COMMERCIAL

## 2021-08-13 VITALS
SYSTOLIC BLOOD PRESSURE: 149 MMHG | HEART RATE: 60 BPM | DIASTOLIC BLOOD PRESSURE: 75 MMHG | WEIGHT: 278 LBS | HEIGHT: 74 IN | BODY MASS INDEX: 35.68 KG/M2 | TEMPERATURE: 97.6 F | RESPIRATION RATE: 15 BRPM | OXYGEN SATURATION: 100 %

## 2021-08-13 DIAGNOSIS — R10.84 ABDOMINAL PAIN, GENERALIZED: ICD-10-CM

## 2021-08-13 DIAGNOSIS — K43.9 ABDOMINAL WALL HERNIA: Primary | ICD-10-CM

## 2021-08-13 DIAGNOSIS — R11.2 NON-INTRACTABLE VOMITING WITH NAUSEA, UNSPECIFIED VOMITING TYPE: ICD-10-CM

## 2021-08-13 LAB
ALBUMIN SERPL-MCNC: 3.9 G/DL (ref 3.5–5)
ALBUMIN/GLOB SERPL: 0.9 {RATIO} (ref 1.1–2.2)
ALP SERPL-CCNC: 103 U/L (ref 45–117)
ALT SERPL-CCNC: 20 U/L (ref 12–78)
ANION GAP SERPL CALC-SCNC: 10 MMOL/L (ref 5–15)
APPEARANCE UR: CLEAR
AST SERPL-CCNC: 18 U/L (ref 15–37)
BACTERIA URNS QL MICRO: NEGATIVE /HPF
BASOPHILS # BLD: 0.1 K/UL (ref 0–0.1)
BASOPHILS NFR BLD: 0 % (ref 0–1)
BILIRUB SERPL-MCNC: 0.5 MG/DL (ref 0.2–1)
BILIRUB UR QL: NEGATIVE
BUN SERPL-MCNC: 18 MG/DL (ref 6–20)
BUN/CREAT SERPL: 14 (ref 12–20)
CALCIUM SERPL-MCNC: 10.3 MG/DL (ref 8.5–10.1)
CHLORIDE SERPL-SCNC: 104 MMOL/L (ref 97–108)
CO2 SERPL-SCNC: 26 MMOL/L (ref 21–32)
COLOR UR: ABNORMAL
CREAT SERPL-MCNC: 1.26 MG/DL (ref 0.7–1.3)
DIFFERENTIAL METHOD BLD: ABNORMAL
EOSINOPHIL # BLD: 0 K/UL (ref 0–0.4)
EOSINOPHIL NFR BLD: 0 % (ref 0–7)
EPITH CASTS URNS QL MICRO: ABNORMAL /LPF
ERYTHROCYTE [DISTWIDTH] IN BLOOD BY AUTOMATED COUNT: 15.6 % (ref 11.5–14.5)
GLOBULIN SER CALC-MCNC: 4.5 G/DL (ref 2–4)
GLUCOSE SERPL-MCNC: 174 MG/DL (ref 65–100)
GLUCOSE UR STRIP.AUTO-MCNC: NEGATIVE MG/DL
HCT VFR BLD AUTO: 41.5 % (ref 36.6–50.3)
HGB BLD-MCNC: 13.6 G/DL (ref 12.1–17)
HGB UR QL STRIP: NEGATIVE
HYALINE CASTS URNS QL MICRO: ABNORMAL /LPF (ref 0–5)
IMM GRANULOCYTES # BLD AUTO: 0.1 K/UL (ref 0–0.04)
IMM GRANULOCYTES NFR BLD AUTO: 1 % (ref 0–0.5)
KETONES UR QL STRIP.AUTO: NEGATIVE MG/DL
LEUKOCYTE ESTERASE UR QL STRIP.AUTO: NEGATIVE
LIPASE SERPL-CCNC: 272 U/L (ref 73–393)
LYMPHOCYTES # BLD: 1 K/UL (ref 0.8–3.5)
LYMPHOCYTES NFR BLD: 6 % (ref 12–49)
MCH RBC QN AUTO: 28.7 PG (ref 26–34)
MCHC RBC AUTO-ENTMCNC: 32.8 G/DL (ref 30–36.5)
MCV RBC AUTO: 87.6 FL (ref 80–99)
MONOCYTES # BLD: 0.5 K/UL (ref 0–1)
MONOCYTES NFR BLD: 3 % (ref 5–13)
NEUTS SEG # BLD: 14.3 K/UL (ref 1.8–8)
NEUTS SEG NFR BLD: 90 % (ref 32–75)
NITRITE UR QL STRIP.AUTO: NEGATIVE
NRBC # BLD: 0 K/UL (ref 0–0.01)
NRBC BLD-RTO: 0 PER 100 WBC
PH UR STRIP: 7.5 [PH] (ref 5–8)
PLATELET # BLD AUTO: 249 K/UL (ref 150–400)
PMV BLD AUTO: 9.3 FL (ref 8.9–12.9)
POTASSIUM SERPL-SCNC: 3.8 MMOL/L (ref 3.5–5.1)
PROT SERPL-MCNC: 8.4 G/DL (ref 6.4–8.2)
PROT UR STRIP-MCNC: ABNORMAL MG/DL
RBC # BLD AUTO: 4.74 M/UL (ref 4.1–5.7)
RBC #/AREA URNS HPF: ABNORMAL /HPF (ref 0–5)
SODIUM SERPL-SCNC: 140 MMOL/L (ref 136–145)
SP GR UR REFRACTOMETRY: 1.03 (ref 1–1.03)
UA: UC IF INDICATED,UAUC: ABNORMAL
UROBILINOGEN UR QL STRIP.AUTO: 1 EU/DL (ref 0.2–1)
WBC # BLD AUTO: 16 K/UL (ref 4.1–11.1)
WBC URNS QL MICRO: ABNORMAL /HPF (ref 0–4)

## 2021-08-13 PROCEDURE — 74177 CT ABD & PELVIS W/CONTRAST: CPT

## 2021-08-13 PROCEDURE — 36415 COLL VENOUS BLD VENIPUNCTURE: CPT

## 2021-08-13 PROCEDURE — 96376 TX/PRO/DX INJ SAME DRUG ADON: CPT

## 2021-08-13 PROCEDURE — 80053 COMPREHEN METABOLIC PANEL: CPT

## 2021-08-13 PROCEDURE — 74011250636 HC RX REV CODE- 250/636: Performed by: EMERGENCY MEDICINE

## 2021-08-13 PROCEDURE — 96361 HYDRATE IV INFUSION ADD-ON: CPT

## 2021-08-13 PROCEDURE — 96375 TX/PRO/DX INJ NEW DRUG ADDON: CPT

## 2021-08-13 PROCEDURE — 83690 ASSAY OF LIPASE: CPT

## 2021-08-13 PROCEDURE — 85025 COMPLETE CBC W/AUTO DIFF WBC: CPT

## 2021-08-13 PROCEDURE — 99285 EMERGENCY DEPT VISIT HI MDM: CPT

## 2021-08-13 PROCEDURE — 81001 URINALYSIS AUTO W/SCOPE: CPT

## 2021-08-13 PROCEDURE — 74011000636 HC RX REV CODE- 636: Performed by: EMERGENCY MEDICINE

## 2021-08-13 PROCEDURE — 96374 THER/PROPH/DIAG INJ IV PUSH: CPT

## 2021-08-13 PROCEDURE — 74011250637 HC RX REV CODE- 250/637: Performed by: EMERGENCY MEDICINE

## 2021-08-13 RX ORDER — ONDANSETRON 2 MG/ML
4 INJECTION INTRAMUSCULAR; INTRAVENOUS
Status: COMPLETED | OUTPATIENT
Start: 2021-08-13 | End: 2021-08-13

## 2021-08-13 RX ORDER — DICYCLOMINE HYDROCHLORIDE 10 MG/1
10 CAPSULE ORAL
Qty: 10 CAPSULE | Refills: 0 | Status: SHIPPED | OUTPATIENT
Start: 2021-08-13

## 2021-08-13 RX ORDER — FENTANYL CITRATE 50 UG/ML
50 INJECTION, SOLUTION INTRAMUSCULAR; INTRAVENOUS
Status: DISCONTINUED | OUTPATIENT
Start: 2021-08-13 | End: 2021-08-13 | Stop reason: HOSPADM

## 2021-08-13 RX ORDER — TRAMADOL HYDROCHLORIDE 50 MG/1
50 TABLET ORAL
Qty: 10 TABLET | Refills: 0 | Status: SHIPPED | OUTPATIENT
Start: 2021-08-13 | End: 2021-08-16

## 2021-08-13 RX ORDER — ONDANSETRON 4 MG/1
4 TABLET, ORALLY DISINTEGRATING ORAL
Qty: 10 TABLET | Refills: 0 | Status: SHIPPED | OUTPATIENT
Start: 2021-08-13

## 2021-08-13 RX ORDER — ONDANSETRON 2 MG/ML
4 INJECTION INTRAMUSCULAR; INTRAVENOUS
Status: DISCONTINUED | OUTPATIENT
Start: 2021-08-13 | End: 2021-08-13 | Stop reason: HOSPADM

## 2021-08-13 RX ORDER — FENTANYL CITRATE 50 UG/ML
50 INJECTION, SOLUTION INTRAMUSCULAR; INTRAVENOUS
Status: COMPLETED | OUTPATIENT
Start: 2021-08-13 | End: 2021-08-13

## 2021-08-13 RX ORDER — DICYCLOMINE HYDROCHLORIDE 20 MG/1
20 TABLET ORAL
Status: COMPLETED | OUTPATIENT
Start: 2021-08-13 | End: 2021-08-13

## 2021-08-13 RX ADMIN — IOPAMIDOL 100 ML: 755 INJECTION, SOLUTION INTRAVENOUS at 03:13

## 2021-08-13 RX ADMIN — DICYCLOMINE HYDROCHLORIDE 20 MG: 20 TABLET ORAL at 09:56

## 2021-08-13 RX ADMIN — FENTANYL CITRATE 50 MCG: 50 INJECTION, SOLUTION INTRAMUSCULAR; INTRAVENOUS at 01:48

## 2021-08-13 RX ADMIN — SODIUM CHLORIDE 1000 ML: 9 INJECTION, SOLUTION INTRAVENOUS at 01:42

## 2021-08-13 RX ADMIN — FENTANYL CITRATE 50 MCG: 50 INJECTION, SOLUTION INTRAMUSCULAR; INTRAVENOUS at 06:00

## 2021-08-13 RX ADMIN — ONDANSETRON 4 MG: 2 INJECTION INTRAMUSCULAR; INTRAVENOUS at 12:19

## 2021-08-13 RX ADMIN — ONDANSETRON 4 MG: 2 INJECTION INTRAMUSCULAR; INTRAVENOUS at 01:49

## 2021-08-13 NOTE — DISCHARGE INSTRUCTIONS
It was a pleasure taking care of you in our Emergency Department today. We know that when you come to [facility], you are entrusting us with your health, comfort, and safety. Our physicians and nurses honor that trust, and truly appreciate the opportunity to care for you and your loved ones. We also value your feedback. If you receive a survey about your Emergency Department experience today, please fill it out. We care about our patients' feedback, and we listen to what you have to say. Thank you!       Dr. Yara Alcocer MD.

## 2021-08-13 NOTE — ED NOTES
Bedside shift change report given to Caroline RN (oncoming nurse) by Juan Fleming RN (offgoing nurse). Report included the following information SBAR, ED Summary and MAR.

## 2021-08-13 NOTE — ED NOTES
Report given to ROSANGELA Guardado. They were informed of patient chief complaint, current status, orders completed (to include IV access/medications/radiology testing), outstanding orders that still need to be completed, and the treatment plan. Ensured no questions or concerns regarding the patient prior to departure.

## 2021-08-13 NOTE — ED PROVIDER NOTES
EMERGENCY DEPARTMENT HISTORY AND PHYSICAL EXAM     ----------------------------------------------------------------------------  Please note that this dictation was completed with [email or web address], the computer voice recognition software. Quite often unanticipated grammatical, syntax, homophones, and other interpretive errors are inadvertently transcribed by the computer software. Please disregard these errors. Please excuse any errors that have escaped final proofreading  ----------------------------------------------------------------------------      Date: 8/13/2021  Patient Name: Nelli Ramsay    History of Presenting Illness     Chief Complaint   Patient presents with    Abdominal Pain     Patient brought in by EMS from home for complaints of abdominal pain 10/10, nausea and vomiting. Patient states that the vomit looks like bile. \"brownish green in color\" since yesterday around 11am.       History Provided By:  Patient    HPI: Nelli Ramsay is a 80 y.o. male, with significant pmhx of hypertension, CAD, GERD, hypercholesterolemia, morbid obesity, who presents via private EMS to the ED with c/o right-sided abdominal pain associated nausea and vomiting for the last several days. Notes having brownish/green-colored vomitus earlier today prompting her to come the emergency department for further evaluation. Patient also specifically denies any associated fevers, chills, CP,  urinary sxs, or headache. Social Hx: denies tobacco  denies EtOH , denies recreational/Illicit Drugs    There are no other complaints, changes, or physical findings at this time.      PCP: Ian Pineda MD    Allergies   Allergen Reactions    Percocet [Oxycodone-Acetaminophen] Other (comments)       Current Facility-Administered Medications   Medication Dose Route Frequency Provider Last Rate Last Admin    ondansetron (ZOFRAN) injection 4 mg  4 mg IntraVENous Q1H PRN May Chowdhury MD   4 mg at 08/13/21 0149    fentaNYL citrate (PF) injection 50 mcg  50 mcg IntraVENous Q1H PRN Tony Gamez MD   50 mcg at 08/13/21 0148     Current Outpatient Medications   Medication Sig Dispense Refill    ondansetron (Zofran ODT) 4 mg disintegrating tablet Take 1 Tablet by mouth every eight (8) hours as needed for Nausea. 10 Tablet 0    dicyclomine (BENTYL) 10 mg capsule Take 1 Capsule by mouth three (3) times daily as needed for Abdominal Cramps. 10 Capsule 0    traMADoL (ULTRAM) 50 mg tablet Take 1 Tablet by mouth every six (6) hours as needed for Pain for up to 3 days. Max Daily Amount: 200 mg. Indications: pain 10 Tablet 0    apixaban (Eliquis) 5 mg tablet Take 1 Tablet by mouth two (2) times a day. 60 Tablet 0    losartan (COZAAR) 100 mg tablet Take 100 mg by mouth daily.  amLODIPine (Norvasc) 5 mg tablet Take 5 mg by mouth daily.  hydroCHLOROthiazide (HYDRODIURIL) 25 mg tablet Take 25 mg by mouth daily.  traMADoL (ULTRAM) 50 mg tablet Take 50 mg by mouth every six (6) hours as needed for Pain.  fenofibrate micronized (LOFIBRA) 134 mg capsule Take 134 mg by mouth daily.  atorvastatin (LIPITOR) 20 mg tablet Take 1 Tablet by mouth daily. 30 Tablet 12    baclofen (LIORESAL) 10 mg tablet Take  by mouth three (3) times daily.  doxazosin (CARDURA) 4 mg tablet Take 4 mg by mouth daily.  ASPIRIN PO Take 81 mg by mouth daily.          Past History     Past Medical History:  Past Medical History:   Diagnosis Date    Arthritis     CAD (coronary artery disease)     GERD (gastroesophageal reflux disease)     resolved after wt loss    Hypercholesterolemia     Hypertension     Ill-defined condition     intentional wt loss - 125 pounds    Ill-defined condition     spinal stenosis - pain is better since wt loss    Morbid obesity ([facility])        Past Surgical History:  Past Surgical History:   Procedure Laterality Date    COLONOSCOPY N/A 6/23/2020    COLONOSCOPY awaiting performed by José Miguel Ansari MD at [facility] ENDOSCOPY    HX HERNIA REPAIR  2016    \"failed\" per pt    HX UROLOGICAL  2010    1 kidney removed d/t cyst and spot - no cancer    [place] CARDIAC SURG PROCEDURE UNLIST  2013    3 bypass card       Family History:  Family History   Problem Relation Age of Onset    Cancer Mother         Stomach Cancer    Heart Disease Father     Stroke Father     Cancer Sister         spot on brain/lung cancer    No Known Problems Brother     No Known Problems Brother     No Known Problems Brother     No Known Problems Sister     HIV/AIDS Brother     Other Brother         pancreatitis       Social History:  Social History     Tobacco Use    Smoking status: Former Smoker     Packs/day: 0.25     Years: 30.00     Pack years: 7.50    Smokeless tobacco: Never Used    Tobacco comment: 7-8 cigarettes a day./quit a couple of weeks ago per pt on 6/16/2020   Substance Use Topics    Alcohol use: No    Drug use: Not Currently       Allergies: Allergies   Allergen Reactions    Percocet [Oxycodone-Acetaminophen] Other (comments)         Review of Systems   Review of Systems   Constitutional: Negative for chills and fever. HENT: Negative. Eyes: Negative. Respiratory: Negative for cough, chest tightness and shortness of breath. Cardiovascular: Negative for chest pain and leg swelling. Gastrointestinal: Positive for abdominal pain, nausea and vomiting. Negative for diarrhea. Endocrine: Negative. Genitourinary: Negative for difficulty urinating and dysuria. Musculoskeletal: Negative for myalgias. Skin: Negative. Neurological: Negative. Psychiatric/Behavioral: Negative. All other systems reviewed and are negative. Physical Exam   Physical Exam  Vitals and nursing note reviewed. Constitutional:       General: He is not in acute distress. Appearance: He is well-developed. He is obese. He is not diaphoretic. HENT:      Head: Normocephalic and atraumatic.       Nose: Nose normal.      Mouth/Throat:      Pharynx: No oropharyngeal exudate. Eyes:      Conjunctiva/sclera: Conjunctivae normal.      Pupils: Pupils are equal, round, and reactive to light. Neck:      Vascular: No JVD. Cardiovascular:      Rate and Rhythm: Normal rate and regular rhythm. Heart sounds: Normal heart sounds. No murmur heard. No friction rub. Pulmonary:      Effort: Pulmonary effort is normal. No respiratory distress. Breath sounds: Normal breath sounds. No stridor. No wheezing or rales. Abdominal:      General: Bowel sounds are normal. There is no distension. Palpations: Abdomen is soft. Tenderness: There is no abdominal tenderness. There is no rebound. Musculoskeletal:         General: No tenderness. Normal range of motion. Cervical back: Normal range of motion and neck supple. Skin:     General: Skin is warm and dry. Findings: No rash. Neurological:      Mental Status: He is alert and oriented to person, place, and time. Cranial Nerves: No cranial nerve deficit. Psychiatric:         Speech: Speech normal.         Behavior: Behavior normal.         Thought Content: Thought content normal.         Judgment: Judgment normal.           Diagnostic Study Results     Labs -     Recent Results (from the past 12 hour(s))   CBC WITH AUTOMATED DIFF    Collection Time: 08/13/21  1:39 AM   Result Value Ref Range    WBC 16.0 (H) 4.1 - 11.1 K/uL    RBC 4.74 4.10 - 5.70 M/uL    HGB 13.6 12.1 - 17.0 g/dL    HCT 41.5 36.6 - 50.3 %    MCV 87.6 80.0 - 99.0 FL    MCH 28.7 26.0 - 34.0 PG    MCHC 32.8 30.0 - 36.5 g/dL    RDW 15.6 (H) 11.5 - 14.5 %    PLATELET 857 190 - 424 K/uL    MPV 9.3 8.9 - 12.9 FL    NRBC 0.0 0  WBC    ABSOLUTE NRBC 0.00 0.00 - 0.01 K/uL    NEUTROPHILS 90 (H) 32 - 75 %    LYMPHOCYTES 6 (L) 12 - 49 %    MONOCYTES 3 (L) 5 - 13 %    EOSINOPHILS 0 0 - 7 %    BASOPHILS 0 0 - 1 %    IMMATURE GRANULOCYTES 1 (H) 0.0 - 0.5 %    ABS. NEUTROPHILS 14.3 (H) 1.8 - 8.0 K/UL    ABS.  LYMPHOCYTES 1.0 0.8 - 3.5 K/UL    ABS. MONOCYTES 0.5 0.0 - 1.0 K/UL    ABS. EOSINOPHILS 0.0 0.0 - 0.4 K/UL    ABS. BASOPHILS 0.1 0.0 - 0.1 K/UL    ABS. IMM. GRANS. 0.1 (H) 0.00 - 0.04 K/UL    DF AUTOMATED     METABOLIC PANEL, COMPREHENSIVE    Collection Time: 08/13/21  1:39 AM   Result Value Ref Range    Sodium 140 136 - 145 mmol/L    Potassium 3.8 3.5 - 5.1 mmol/L    Chloride 104 97 - 108 mmol/L    CO2 26 21 - 32 mmol/L    Anion gap 10 5 - 15 mmol/L    Glucose 174 (H) 65 - 100 mg/dL    BUN 18 6 - 20 MG/DL    Creatinine 1.26 0.70 - 1.30 MG/DL    BUN/Creatinine ratio 14 12 - 20      GFR est AA >60 >60 ml/min/1.73m2    GFR est non-AA 57 (L) >60 ml/min/1.73m2    Calcium 10.3 (H) 8.5 - 10.1 MG/DL    Bilirubin, total 0.5 0.2 - 1.0 MG/DL    ALT (SGPT) 20 12 - 78 U/L    AST (SGOT) 18 15 - 37 U/L    Alk. phosphatase 103 45 - 117 U/L    Protein, total 8.4 (H) 6.4 - 8.2 g/dL    Albumin 3.9 3.5 - 5.0 g/dL    Globulin 4.5 (H) 2.0 - 4.0 g/dL    A-G Ratio 0.9 (L) 1.1 - 2.2     LIPASE    Collection Time: 08/13/21  1:39 AM   Result Value Ref Range    Lipase 272 73 - 393 U/L   URINALYSIS W/ REFLEX CULTURE    Collection Time: 08/13/21  3:11 AM    Specimen: Urine   Result Value Ref Range    Color YELLOW/STRAW      Appearance CLEAR CLEAR      Specific gravity 1.030 1.003 - 1.030      pH (UA) 7.5 5.0 - 8.0      Protein TRACE (A) NEG mg/dL    Glucose Negative NEG mg/dL    Ketone Negative NEG mg/dL    Bilirubin Negative NEG      Blood Negative NEG      Urobilinogen 1.0 0.2 - 1.0 EU/dL    Nitrites Negative NEG      Leukocyte Esterase Negative NEG      UA:UC IF INDICATED CULTURE NOT INDICATED BY UA RESULT CNI      WBC 0-4 0 - 4 /hpf    RBC 0-5 0 - 5 /hpf    Epithelial cells FEW FEW /lpf    Bacteria Negative NEG /hpf    Hyaline cast 0-2 0 - 5 /lpf       Radiologic Studies -   CT ABD PELV W CONT   Final Result   Large left side anterior abdominal wall hernia containing distended fluid-filled   small bowel loops.         CT Results  (Last 48 hours) 08/13/21 0313  CT ABD PELV W CONT Final result    Impression:  Large left side anterior abdominal wall hernia containing distended fluid-filled   small bowel loops. Narrative:  EXAM: CT ABD PELV W CONT       INDICATION: abd pain, R sided       COMPARISON: None        CONTRAST: 100 mL of Isovue-370. TECHNIQUE:    Following the uneventful intravenous administration of contrast, thin axial   images were obtained through the abdomen and pelvis. Coronal and sagittal   reconstructions were generated. Oral contrast was not administered. CT dose   reduction was achieved through use of a standardized protocol tailored for this   examination and automatic exposure control for dose modulation. FINDINGS:    LOWER THORAX: No significant abnormality in the incidentally imaged lower chest.   LIVER: No mass. BILIARY TREE: Gallbladder is within normal limits. CBD is not dilated. SPLEEN: within normal limits. PANCREAS: No mass or ductal dilatation. ADRENALS: Unremarkable. KIDNEYS: No mass, calculus, or hydronephrosis. No left kidney visible. STOMACH: Unremarkable. Large left side anterior abdominal wall hernia does contain fluid-filled   slightly distended small bowel loops. It does also contain colon and stomach   which is not distended. Fecal stasis rectosigmoid. .   APPENDIX:   PERITONEUM: No ascites or pneumoperitoneum. RETROPERITONEUM: No lymphadenopathy or aortic aneurysm. REPRODUCTIVE ORGANS:   URINARY BLADDER: No mass or calculus. BONES: No destructive bone lesion. ABDOMINAL WALL: No mass or hernia. ADDITIONAL COMMENTS: N/A               CXR Results  (Last 48 hours)    None            Medical Decision Making   I am the first provider for this patient. I reviewed the vital signs, available nursing notes, past medical history, past surgical history, family history and social history. Vital Signs-Reviewed the patient's vital signs.   Patient Vitals for the past 12 hrs:   Temp Pulse Resp BP SpO2   08/13/21 0430  60 15 (!) 149/75    08/13/21 0400  60 17 (!) 149/73    08/13/21 0330  61 15 (!) 149/75    08/13/21 0200  61 17 (!) 162/67 100 %   08/13/21 0100 97.6 °F (36.4 °C) 64 18 (!) 149/62 100 %       Pulse Oximetry Analysis - 100% on RA, normal  Rate: 60 bpm  Rhythm: nsr      Provider Notes (Medical Decision Making):     DDX:  Obstruction, gallbladder disease, pancreatitis, gastroenteritis    Plan:  Labs, CT scan, analgesia, antiemetic, IV fluid    Impression:  Large abdominal wall hernia, recurrent abdominal pain    ED Course:   Initial assessment performed. The patients presenting problems have been discussed, and they are in agreement with the care plan formulated and outlined with them. I have encouraged them to ask questions as they arise throughout their visit. I reviewed the nursing notes and and vital signs from today's visit, as well as the electronic medical record system for any past medical records that were available that may contribute to the patients current condition, including previous evaluation for sick sinus syndrome    Nursing notes will be reviewed as they become available in realtime while the pt has been in the ED. Rock Mandy MD      I personally reviewed/interpreted pt's imaging. Agree with official read by radiology as noted above. Rock Mandy MD    PROGRESS NOTE:  6:57 AM  Pt noted to be feeling much improved. Discussed findings on CT with chronic hernia. Patient no longer tender to palpation and with like to attempt p.o. challenge. Will trial and see if patient's able to keep liquids down. Rock Mandy MD      7:34 AM  Progress note:  Pt noted to be feeling better, able to tolerate PO, ready for discharge. Discussed lab and imaging findings with pt, specifically noting large abdominal hernia without report of obstruction. Pt will follow up with primary care, GI and surgery at [facility] where he had his previous hernia repair as instructed. All questions have been answered, pt voiced understanding and agreement with plan. Specific return precautions provided (nausea, vomiting increased pain) in addition to instructions for pt to return to the ED immediately should sx worsen at any time. Marco A Huertas MD           Critical Care Time:     none      Diagnosis     Clinical Impression:   1. Abdominal wall hernia    2. Non-intractable vomiting with nausea, unspecified vomiting type    3. Abdominal pain, generalized        PLAN:  1. Current Discharge Medication List      START taking these medications    Details   ondansetron (Zofran ODT) 4 mg disintegrating tablet Take 1 Tablet by mouth every eight (8) hours as needed for Nausea. Qty: 10 Tablet, Refills: 0  Start date: 8/13/2021      dicyclomine (BENTYL) 10 mg capsule Take 1 Capsule by mouth three (3) times daily as needed for Abdominal Cramps. Qty: 10 Capsule, Refills: 0  Start date: 8/13/2021      !! traMADoL (ULTRAM) 50 mg tablet Take 1 Tablet by mouth every six (6) hours as needed for Pain for up to 3 days. Max Daily Amount: 200 mg. Indications: pain  Qty: 10 Tablet, Refills: 0  Start date: 8/13/2021, End date: 8/16/2021    Associated Diagnoses: Abdominal pain, generalized       !! - Potential duplicate medications found. Please discuss with provider. CONTINUE these medications which have NOT CHANGED    Details   !! traMADoL (ULTRAM) 50 mg tablet Take 50 mg by mouth every six (6) hours as needed for Pain. !! - Potential duplicate medications found. Please discuss with provider. 2.   Follow-up Information     Follow up With Specialties Details Why Contact Info    Kalpaan Ross MD [facility] Medicine Schedule an appointment as soon as possible for a visit in 2 days  [address] [place] [address] [phone]          Return to ED if worse     Disposition:  7:35 AM  The patient's results have been reviewed with family and/or caregiver.  They verbally convey their understanding and agreement of the patient's signs, symptoms, diagnosis, treatment and prognosis and additionally agree to follow up as recommended in the discharge instructions or to return to the Emergency Room should the patient's condition change prior to their follow-up appointment. The family and/or caregiver verbally agrees with the care-plan and all of their questions have been answered. The discharge instructions have also been provided to the them with educational information regarding the patient's diagnosis as well a list of reasons why the patient would want to return to the ER prior to their follow-up appointment should their condition change.   Shashi Macias MD

## 2021-08-13 NOTE — ED NOTES
Reviewed discharge instructions with patient     Pt is notified of ETA of transportation ([phone])    Pt is resting comfortably with call bell within reach

## 2021-08-16 ENCOUNTER — APPOINTMENT (OUTPATIENT)
Dept: CT IMAGING | Age: 71
End: 2021-08-16
Attending: EMERGENCY MEDICINE
Payer: MEDICARE

## 2021-08-16 ENCOUNTER — HOSPITAL ENCOUNTER (EMERGENCY)
Age: 71
Discharge: HOME | End: 2021-08-16
Attending: EMERGENCY MEDICINE | Admitting: EMERGENCY MEDICINE
Payer: MEDICARE

## 2021-08-16 VITALS
HEART RATE: 77 BPM | OXYGEN SATURATION: 99 % | SYSTOLIC BLOOD PRESSURE: 150 MMHG | TEMPERATURE: 97.8 F | RESPIRATION RATE: 20 BRPM | DIASTOLIC BLOOD PRESSURE: 85 MMHG

## 2021-08-16 DIAGNOSIS — Z53.21 ELOPED FROM EMERGENCY DEPARTMENT: Primary | ICD-10-CM

## 2021-08-16 LAB
ATRIAL RATE: 72 BPM
CALCULATED P AXIS, ECG09: 73 DEGREES
CALCULATED R AXIS, ECG10: 29 DEGREES
CALCULATED T AXIS, ECG11: 147 DEGREES
DIAGNOSIS, 93000: NORMAL
P-R INTERVAL, ECG05: 146 MS
Q-T INTERVAL, ECG07: 410 MS
QRS DURATION, ECG06: 100 MS
QTC CALCULATION (BEZET), ECG08: 448 MS
VENTRICULAR RATE, ECG03: 72 BPM

## 2021-08-16 PROCEDURE — 93005 ELECTROCARDIOGRAM TRACING: CPT

## 2021-08-16 PROCEDURE — 99283 EMERGENCY DEPT VISIT LOW MDM: CPT

## 2021-08-16 PROCEDURE — 74176 CT ABD & PELVIS W/O CONTRAST: CPT

## 2021-08-16 NOTE — ED NOTES
Patient placed in room and I assumed care of patient. Informed patient and family member that the only test pending is a urinalysis and that we need a urine sample.

## 2021-08-16 NOTE — ED PROVIDER NOTES
60-year-old male with a history of abdominal wall hernia presents with a complaint of constipation, diarrhea and vomiting bile. He was seen recently at [facility] but states that he was only told there was something wrong with his intestines. Patient has not had any fevers, chills, chest pain, shortness of breath or urinary symptoms.              Past Medical History:   Diagnosis Date    Arthritis     CAD (coronary artery disease)     GERD (gastroesophageal reflux disease)     resolved after wt loss    Hypercholesterolemia     Hypertension     Ill-defined condition     intentional wt loss - 125 pounds    Ill-defined condition     spinal stenosis - pain is better since wt loss    Morbid obesity ([address]        Past Surgical History:   Procedure Laterality Date    COLONOSCOPY N/A 6/23/2020    COLONOSCOPY awaiting performed by Zoraida Yañez MD at [facility]  2016    \"failed\" per pt    HX UROLOGICAL  2010    1 kidney removed d/t cyst and spot - no cancer    [place] CARDIAC SURG PROCEDURE UNLIST  2013    3 bypass card         Family History:   Problem Relation Age of Onset    Cancer Mother         Stomach Cancer    Heart Disease Father     Stroke Father     Cancer Sister         spot on brain/lung cancer    No Known Problems Brother     No Known Problems Brother     No Known Problems Brother     No Known Problems Sister     HIV/AIDS Brother     Other Brother         pancreatitis       Social History     Socioeconomic History    Marital status:      Spouse name: Not on file    Number of children: Not on file    Years of education: Not on file    Highest education level: Not on file   Occupational History    Not on file   Tobacco Use    Smoking status: Former Smoker     Packs/day: 0.25     Years: 30.00     Pack years: 7.50    Smokeless tobacco: Never Used    Tobacco comment: 7-8 cigarettes a day./quit a couple of weeks ago per pt on 6/16/2020   Substance and Sexual Activity    Alcohol use: No    Drug use: Not Currently    Sexual activity: Yes     Partners: Female   Other Topics Concern    Not on file   Social History Narrative    Not on file     Social Determinants of Health     Financial Resource Strain:     Difficulty of Paying Living Expenses:    Food Insecurity:     Worried About Running Out of Food in the Last Year:     [address] in the Last Year:    Transportation Needs:     Lack of Transportation (Medical):  Lack of Transportation (Non-Medical):    Physical Activity:     Days of Exercise per Week:     Minutes of Exercise per Session:    Stress:     Feeling of Stress :    Social Connections:     Frequency of Communication with Friends and Family:     Frequency of Social Gatherings with Friends and Family:     Attends Restoration Services:     Active Member of Clubs or Organizations:     Attends Club or Organization Meetings:     Marital Status:    Intimate Partner Violence:     Fear of Current or Ex-Partner:     Emotionally Abused:     Physically Abused:     Sexually Abused: ALLERGIES: Percocet [oxycodone-acetaminophen]    Review of Systems   Constitutional: Negative for fever. HENT: Negative for rhinorrhea. Respiratory: Negative for cough. Cardiovascular: Negative for chest pain. Gastrointestinal: Positive for abdominal pain and vomiting. Genitourinary: Negative for dysuria. Musculoskeletal: Negative for back pain. Skin: Negative for wound. Neurological: Negative for headaches. Psychiatric/Behavioral: Negative for confusion. Vitals:    08/16/21 1433   BP: (!) 146/80   Pulse: 71   Resp: 20   Temp: 97.8 °F (36.6 °C)   SpO2: 100%            Physical Exam  Vitals and nursing note reviewed. Constitutional:       General: He is not in acute distress. Appearance: Normal appearance. He is obese. He is not ill-appearing, toxic-appearing or diaphoretic. HENT:      Head: Normocephalic.    Eyes: Extraocular Movements: Extraocular movements intact. Cardiovascular:      Rate and Rhythm: Normal rate. Pulses: Normal pulses. Pulmonary:      Effort: Pulmonary effort is normal. No respiratory distress. Breath sounds: Normal breath sounds. No stridor. No wheezing or rales. Abdominal:      General: There is distension. Tenderness: There is abdominal tenderness. Musculoskeletal:         General: Normal range of motion. Cervical back: Normal range of motion. Skin:     General: Skin is dry. Capillary Refill: Capillary refill takes less than 2 seconds. Neurological:      Mental Status: He is alert and oriented to person, place, and time. Psychiatric:         Mood and Affect: Mood normal.          MDM  Number of Diagnoses or Management Options  Diagnosis management comments: Patient presents with abdominal pain. CT shows no obstruction. Patient eloped prior to receiving results. Amount and/or Complexity of Data Reviewed  Clinical lab tests: ordered and reviewed  Tests in the radiology section of CPT®: reviewed and ordered      ED Course as of Aug 16 1749   Mon Aug 16, 2021   [age]-year-old male with a history of abdominal wall hernia presents with a complaint of constipation, diarrhea and vomiting bile. He was seen recently at [facility] but states that he was only told there was something wrong with his intestines.       [RD]      ED Course User Index  [RD] Humera Yi MD       Procedures

## 2021-09-21 ENCOUNTER — TELEPHONE (OUTPATIENT)
Dept: SURGERY | Age: 71
End: 2021-09-21

## 2021-09-21 NOTE — TELEPHONE ENCOUNTER
PT called in regards to the appointment that was made for him by [facility] [place]. PT wanted to R/S the appointment the first week of October.

## 2021-10-04 ENCOUNTER — TELEPHONE (OUTPATIENT)
Dept: SURGERY | Age: 71
End: 2021-10-04

## 2021-10-04 NOTE — TELEPHONE ENCOUNTER
Called and spoke with PT, verified . Reminded PT of his appointment tomorrow and to arrive 30 min. Early to complete paperwork.

## 2021-10-05 ENCOUNTER — TELEPHONE (OUTPATIENT)
Dept: SURGERY | Age: 71
End: 2021-10-05

## 2021-10-05 NOTE — TELEPHONE ENCOUNTER
Called and spoke to PT and stated that per MD, it is best for him to go back to see Dr. Guadalupe Lakhani.  I stated that I was going to cancel his appointment for 10/12 and that he needed to get in touch with Dr. Elda Sebastian office to be seen.

## 2021-10-05 NOTE — TELEPHONE ENCOUNTER
PT's wife called in regards to appointment that was scheduled for today. PT'S wife stated they had arrived at the hospital but needed clarification on where to go. I gave directions on where to go and our floor and suite #.  Pt's wife stated that PT was in a wheelchair- I stated that he could be dropped off while she parked the car and that we have volunteers that could help. PT's wife states that they were excited about this appointment, but sadly needed to R/S as wife stated parking was terrible and that they would need to come in when she could get an aide to help out. PT was R/S a week from original appointment.

## 2022-04-07 NOTE — ANESTHESIA PREPROCEDURE EVALUATION
Relevant Problems   No relevant active problems       Anesthetic History   No history of anesthetic complications            Review of Systems / Medical History  Patient summary reviewed, nursing notes reviewed and pertinent labs reviewed    Pulmonary  Within defined limits            Pertinent negatives: No sleep apnea     Neuro/Psych   Within defined limits           Cardiovascular  Within defined limits  Hypertension          CAD ( s/p cabg; denies angina )  Pertinent negatives: No angina  Exercise tolerance: >4 METS     GI/Hepatic/Renal  Within defined limits           Pertinent negatives: No GERD   Endo/Other  Within defined limits      Obesity     Other Findings              Physical Exam    Airway  Mallampati: I  TM Distance: 4 - 6 cm  Neck ROM: normal range of motion   Mouth opening: Normal     Cardiovascular  Regular rate and rhythm,  S1 and S2 normal,  no murmur, click, rub, or gallop             Dental    Dentition: Full upper dentures and Full lower dentures     Pulmonary  Breath sounds clear to auscultation               Abdominal  Abdominal exam normal       Other Findings            Anesthetic Plan    ASA: 3  Anesthesia type: MAC          Induction: Intravenous  Anesthetic plan and risks discussed with: Patient [Adequate] : adequate

## 2023-05-17 RX ORDER — TRAMADOL HYDROCHLORIDE 50 MG/1
50 TABLET ORAL EVERY 6 HOURS PRN
COMMUNITY

## 2023-05-17 RX ORDER — HYDROCHLOROTHIAZIDE 25 MG/1
25 TABLET ORAL DAILY
COMMUNITY

## 2023-05-17 RX ORDER — ONDANSETRON 4 MG/1
4 TABLET, ORALLY DISINTEGRATING ORAL EVERY 8 HOURS PRN
COMMUNITY
Start: 2021-08-13

## 2023-05-17 RX ORDER — DOXAZOSIN MESYLATE 4 MG/1
4 TABLET ORAL DAILY
COMMUNITY

## 2023-05-17 RX ORDER — DICYCLOMINE HYDROCHLORIDE 10 MG/1
10 CAPSULE ORAL 3 TIMES DAILY PRN
COMMUNITY
Start: 2021-08-13

## 2023-05-17 RX ORDER — FENOFIBRATE 134 MG/1
134 CAPSULE ORAL DAILY
COMMUNITY

## 2023-05-17 RX ORDER — ATORVASTATIN CALCIUM 20 MG/1
20 TABLET, FILM COATED ORAL DAILY
COMMUNITY
Start: 2021-06-02

## 2023-05-17 RX ORDER — AMLODIPINE BESYLATE 5 MG/1
5 TABLET ORAL DAILY
COMMUNITY

## 2023-05-17 RX ORDER — LOSARTAN POTASSIUM 100 MG/1
100 TABLET ORAL DAILY
COMMUNITY

## 2023-05-17 RX ORDER — BACLOFEN 10 MG/1
TABLET ORAL 3 TIMES DAILY
COMMUNITY

## (undated) DEVICE — SYR POWER 150ML 8IN FILL TUBE --

## (undated) DEVICE — TREK CORONARY DILATATION CATHETER 2.50 MM X 15 MM / RAPID-EXCHANGE: Brand: TREK

## (undated) DEVICE — CUSTOM KT PTCA INFL DEV K05 00053H

## (undated) DEVICE — SNARE ENDOSCP M L240CM W27MM SHTH DIA2.4MM CHN 2.8MM OVL

## (undated) DEVICE — SUTURE VCRL 2-0 L27IN ABSRB UD PS-2 L19MM 1/2 CIR J428H

## (undated) DEVICE — REM POLYHESIVE ADULT PATIENT RETURN ELECTRODE: Brand: VALLEYLAB

## (undated) DEVICE — CATH GUID COR EB35 6FR 100CM -- LAUNCHER

## (undated) DEVICE — SUT SLK 0 30IN SH BLK --

## (undated) DEVICE — CATHETER ETER CARD MULTIPAK MULTIPAK 5FR PERFORMA

## (undated) DEVICE — MEDI-TRACE CADENCE ADULT, DEFIBRILLATION ELECTRODE -RTS  (10 PR/PK) - PHILIPS: Brand: MEDI-TRACE CADENCE

## (undated) DEVICE — CATHETER ANGIO IM AD 5 FRX100 CM PERFORMA

## (undated) DEVICE — PINNACLE INTRODUCER SHEATH: Brand: PINNACLE

## (undated) DEVICE — PERCLOSE PROGLIDE™ SUTURE-MEDIATED CLOSURE SYSTEM: Brand: PERCLOSE PROGLIDE™

## (undated) DEVICE — Z DUPLICATE USE 2103554 VALVE HEMOSTATIC BLEEDBK CTRL COPILOT

## (undated) DEVICE — CORONARY IMAGING CATHETER: Brand: OPTICROSS™ 6 HD

## (undated) DEVICE — 3M™ IOBAN™ 2 ANTIMICROBIAL INCISE DRAPE 6650EZ: Brand: IOBAN™ 2

## (undated) DEVICE — DESTINATION RENAL GUIDING SHEATH: Brand: DESTINATION

## (undated) DEVICE — KIT ANGIOGRAPHY CUST MRMC

## (undated) DEVICE — HI-TORQUE VERSACORE FLOPPY GUIDE WIRE SYSTEM 145 CM: Brand: HI-TORQUE VERSACORE

## (undated) DEVICE — GUIDEWIRE VASC L260CM 0.035IN J TIP L3MM PTFE FIX COR NAMIC

## (undated) DEVICE — TRAP SURG QUAD PARABOLA SLOT DSGN SFTY SCRN TRAPEASE

## (undated) DEVICE — CATHETER ANGIO MPA2 AD 5 FRX100 CM 5.8 CM PERFORMA

## (undated) DEVICE — FORCEPS BX L240CM JAW DIA2.8MM L CAP W/ NDL MIC MESH TOOTH

## (undated) DEVICE — PRESSURE GUIDEWIRE: Brand: COMET™ II

## (undated) DEVICE — GUIDEWIRE VASC L145CM 0.035IN J TIP L3MM PTFE FIX COR NAMIC

## (undated) DEVICE — KIT ACCS INTRO 4FR L10CM NDL 21GA L7CM GWIRE L40CM

## (undated) DEVICE — CATH BLLN ANGIO 4.50X8MM NC EUPHORIA RX

## (undated) DEVICE — INTRO PEELWY HEMVLV 7F 13CM -- SHRT PRELUDE SNAP

## (undated) DEVICE — PACEMAKER PACK: Brand: MEDLINE INDUSTRIES, INC.

## (undated) DEVICE — CATH ANGI BLLN DIL 3.5X15MM -- NC EUPHORA

## (undated) DEVICE — PACK PROCEDURE SURG HRT CATH

## (undated) DEVICE — CATH GUID .056IN 6FR 150CM -- GUIDELINER V3

## (undated) DEVICE — GUIDEWIRE VASC L40CM DIA0018IN NDL 21GA L7CM Z S STL MAK

## (undated) DEVICE — ADHESIVE SKIN CLOSURE HI VISC MIC 0.5 CC PREMIERPRO EXOFIN

## (undated) DEVICE — RUNTHROUGH NS EXTRA FLOPPY PTCA GUIDEWIRE: Brand: RUNTHROUGH

## (undated) DEVICE — TRAY,IRRIGATION,PISTON SYRINGE,60ML,STRL: Brand: MEDLINE

## (undated) DEVICE — SUTURE COAT VCRL SZ 4-0 L18IN ABSRB UD L19MM PS-2 1/2 CIR J496G